# Patient Record
Sex: FEMALE | Race: WHITE | Employment: UNEMPLOYED | URBAN - METROPOLITAN AREA
[De-identification: names, ages, dates, MRNs, and addresses within clinical notes are randomized per-mention and may not be internally consistent; named-entity substitution may affect disease eponyms.]

---

## 2017-01-23 ENCOUNTER — OFFICE VISIT (OUTPATIENT)
Dept: INTERNAL MEDICINE CLINIC | Age: 37
End: 2017-01-23

## 2017-01-23 VITALS
TEMPERATURE: 98.5 F | DIASTOLIC BLOOD PRESSURE: 77 MMHG | WEIGHT: 130.8 LBS | HEIGHT: 69 IN | BODY MASS INDEX: 19.37 KG/M2 | SYSTOLIC BLOOD PRESSURE: 113 MMHG | HEART RATE: 77 BPM | RESPIRATION RATE: 12 BRPM

## 2017-01-23 DIAGNOSIS — M25.552 PAIN OF LEFT HIP JOINT: Primary | ICD-10-CM

## 2017-01-23 NOTE — PROGRESS NOTES
Chief Complaint   Patient presents with    Hip Pain     left     Left hip pain  Pt  reports she has had left hip pain for 3 months. She is very active athletically. She reports she has pain in her ant low left hip with sitting with relief with standing and exercise. She does not take anything otc as her sx will resolve with change in position.   No trauma        Past Medical History   Diagnosis Date    Abnormal Pap smear      Colposcopy in     Headache      migraines    Herpes simplex without mention of complication      Cold sores (Oct 2013)    Ill-defined condition      rosacia    Infertility      IVF with first 3 pregnancies, not this pregnancy    Unspecified breast disorder      biospy right breast,galactocele     Past Surgical History   Procedure Laterality Date    Hx leep procedure      Pr exploratory of abdomen       For infertility    Pr  delivery only       Twins    Hx other surgical       Laproscopy for Infertility    Hx other surgical       LEEP    Hx other surgical       D&C    Hx breast augmentation Bilateral 2015     BILATERAL BREAST AUGMENTATION W RIGHT BREAST PERIAREOLAR MASTOPEXY SILICONE  performed by Anat Paredes MD at [address] Hx breast reduction Right 2015     BREAST MASTOPEXY performed by Anat Paredes MD at [address] [facility] [address] Hx breast augmentation Bilateral 2015     [address] [facility] [address]    Hx  section      Hx  section  2014     Social History     Social History    Marital status:      Spouse name: N/A    Number of children: N/A    Years of education: N/A     Social History Main Topics    Smoking status: Never Smoker    Smokeless tobacco: Never Used    Alcohol use 1.2 oz/week     1 Glasses of wine, 1 Cans of beer per week      Comment: rarely    Drug use: No    Sexual activity: Yes     Partners: Male     Birth control/ protection: Surgical      Comment: vasectomy     Other Topics Concern  None     Social History Narrative    Wife of Dr. Hue Nails    At home with the kids        4 children        2, 4, 4, 6        Exercise     Gym 3 times/week     Family History   Problem Relation Age of Onset    Hypertension Father     Asthma Brother     Elevated Lipids Maternal Grandfather     Elevated Lipids Paternal Grandmother     Hypertension Paternal Grandmother      Current Outpatient Prescriptions   Medication Sig Dispense Refill    butalbital-acetaminophen-caffeine (FIORICET, ESGIC) -40 mg per tablet Take 1 Tab by mouth every six (6) hours as needed for Headache. Max Daily Amount: 4 Tabs. 30 Tab 0    rizatriptan (MAXALT) 10 mg tablet Take 1 Tab by mouth once as needed for Migraine for up to 1 dose. May repeat in 2 hours if needed 12 Tab 5    busPIRone (BUSPAR) 10 mg tablet Take 1.5 Tabs by mouth two (2) times a day. Indications: GENERALIZED ANXIETY DISORDER 270 Tab 3    valACYclovir (VALTREX) 1 gram tablet   1    FINACEA 15 % topical gel   3    venlafaxine-SR (EFFEXOR-XR) 37.5 mg capsule Take 1 Cap by mouth daily. 30 Cap 1     No Known Allergies    Review of Systems - General ROS: negative for - chills, fatigue, fever, hot flashes, malaise or night sweats  Cardiovascular ROS: no chest pain or dyspnea on exertion  Respiratory ROS: no cough, shortness of breath, or wheezing    Visit Vitals    /77 (BP 1 Location: Left arm, BP Patient Position: Sitting)    Pulse 77    Temp 98.5 °F (36.9 °C)    Resp 12    Ht 5' 9\" (1.753 m)    Wt 130 lb 12.8 oz (59.3 kg)    BMI 19.32 kg/m2     General Appearance:  Well developed, well nourished,alert and oriented x 3, and individual in no acute distress. Ears/Nose/Mouth/Throat:   Hearing grossly normal.         Neck: Supple, no lad, no bruits   Chest:   Lungs clear to auscultation bilaterally. Cardiovascular:  Regular rate and rhythm, S1, S2 normal, no murmur. Abdomen:   Soft, non-tender, bowel sounds are active.    Extremities: No edema bilaterally. Right hip FROM, left hip standing + reproducible pain with flexion and abduction with resolution on standing, Left leg from with passive movement   Skin: Warm and dry, no suspicious lesions                 Vic Crespo was seen today for hip pain. Diagnoses and all orders for this visit:    Pain of left hip joint  -     REFERRAL TO PHYSICAL THERAPY  I don't think she has hip joint involvement but may be bursa/tensor fascia robyn involvement. I will ask PT to evaluate for alignment and if leg length discrepancy at play which may be related to other muscles in the leg kinetic tree        I spent 15 min with this patient and >50% of the time was spent on counseling and management of anterior hip pain. This note will not be viewable in [address]

## 2017-09-20 ENCOUNTER — HOSPITAL ENCOUNTER (OUTPATIENT)
Dept: LAB | Age: 37
Discharge: HOME | End: 2017-09-20

## 2018-05-09 ENCOUNTER — OFFICE VISIT (OUTPATIENT)
Dept: INTERNAL MEDICINE CLINIC | Age: 38
End: 2018-05-09

## 2018-05-09 VITALS
OXYGEN SATURATION: 99 % | BODY MASS INDEX: 20.11 KG/M2 | WEIGHT: 135.8 LBS | HEART RATE: 86 BPM | SYSTOLIC BLOOD PRESSURE: 125 MMHG | DIASTOLIC BLOOD PRESSURE: 89 MMHG | HEIGHT: 69 IN | RESPIRATION RATE: 16 BRPM | TEMPERATURE: 97.5 F

## 2018-05-09 DIAGNOSIS — F41.1 GENERALIZED ANXIETY DISORDER: ICD-10-CM

## 2018-05-09 DIAGNOSIS — B00.9 HSV-1 INFECTION: ICD-10-CM

## 2018-05-09 DIAGNOSIS — G43.019 INTRACTABLE MIGRAINE WITHOUT AURA AND WITHOUT STATUS MIGRAINOSUS: ICD-10-CM

## 2018-05-09 DIAGNOSIS — G43.019 INTRACTABLE MIGRAINE WITHOUT AURA AND WITHOUT STATUS MIGRAINOSUS: Primary | ICD-10-CM

## 2018-05-09 RX ORDER — BUSPIRONE HYDROCHLORIDE 10 MG/1
TABLET ORAL
Qty: 270 TAB | Refills: 3 | Status: SHIPPED | OUTPATIENT
Start: 2018-05-09 | End: 2019-02-15 | Stop reason: SDUPTHER

## 2018-05-09 RX ORDER — VALACYCLOVIR HYDROCHLORIDE 1 G/1
TABLET, FILM COATED ORAL
Qty: 12 TAB | Refills: 1 | Status: SHIPPED | OUTPATIENT
Start: 2018-05-09 | End: 2019-07-08 | Stop reason: ALTCHOICE

## 2018-05-09 RX ORDER — RIZATRIPTAN BENZOATE 10 MG/1
10 TABLET ORAL
Qty: 12 TAB | Refills: 0 | Status: SHIPPED | OUTPATIENT
Start: 2018-05-09 | End: 2019-07-08 | Stop reason: ALTCHOICE

## 2018-05-09 RX ORDER — BUTALBITAL, ACETAMINOPHEN AND CAFFEINE 50; 325; 40 MG/1; MG/1; MG/1
1 TABLET ORAL
Qty: 30 TAB | Refills: 0 | Status: SHIPPED | OUTPATIENT
Start: 2018-05-09 | End: 2019-07-08 | Stop reason: ALTCHOICE

## 2018-05-09 NOTE — PROGRESS NOTES
Migue Henderson is a 44 y.o. female and presents with Medication Refill  . Subjective:  Pt presents for follow up. She is wife to Dr. Jelly Mccurdy and is in overall good health. 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 DAVID  She reports overall good mental health but does have periods of worry about things that does not stop in her head. She feels she dwells on them   Not having panic attacks. She does get occasional worry about her kids but better controlled than in the past  No SI /no HI  Exercising about 3 times per week  No se on buspar currently  MH 8/10  Sleeping 8 hours, energy level 7/10  Feels tired sometimes but 4 young children  Rare cafferine  Green tea powder smoothie  She does not use ambien regularly but better for her to have near her although does not take. Migraines  She is using maxalt 2/x month and fiorcet 2 times a month as well. She was on migraine prophylaxis meds prior to pregnancy and contemplated on going back. She reports getting good sleep. She is exercising regularly. HSV 1  She reports she gets flares but not as recently as in the past.       Review of Systems  Constitutional: negative for fevers, chills, anorexia and weight loss  Eyes:   negative for visual disturbance and irritation  ENT:   negative for tinnitus,sore throat,nasal congestion,ear pains. hoarseness  Respiratory:  negative for cough, hemoptysis, dyspnea,wheezing  CV:   negative for chest pain, palpitations, lower extremity edema  GI:   negative for nausea, vomiting, diarrhea, abdominal pain,melena  Endo:               negative for polyuria,polydipsia,polyphagia,heat intolerance  Genitourinary: negative for frequency, dysuria and hematuria  Integument:  negative for rash and pruritus  Hematologic:  negative for easy bruising and gum/nose bleeding  Musculoskel: negative for myalgias, arthralgias, back pain, muscle weakness, joint pain  Neurological: negative for headaches, dizziness, vertigo, memory problems and gait   Behavl/Psych: negative for feelings of anxiety, depression, mood changes    Past Medical History:   Diagnosis Date    Abnormal Pap smear     Colposcopy in     Headache     migraines    Herpes simplex without mention of complication     Cold sores (Oct 2013)    Ill-defined condition     rosacia    Infertility     IVF with first 3 pregnancies, not this pregnancy    Unspecified breast disorder     biospy right breast,galactocele     Past Surgical History:   Procedure Laterality Date     DELIVERY ONLY  2012    Twins    EXPLORATORY OF ABDOMEN      For infertility    HX BREAST AUGMENTATION Bilateral 2015    BILATERAL BREAST AUGMENTATION W RIGHT BREAST PERIAREOLAR MASTOPEXY SILICONE  performed by Pily Carrasquillo MD at [address] HX BREAST AUGMENTATION Bilateral 2015    [address], [place]    HX BREAST REDUCTION Right 2015    BREAST MASTOPEXY performed by Pily Carrasquillo MD at [address]      HX  SECTION      HX LEEP PROCEDURE      HX OTHER SURGICAL      Laproscopy for Infertility    HX OTHER SURGICAL      LEEP    HX OTHER SURGICAL      D&C     Social History     Social History    Marital status:      Spouse name: N/A    Number of children: N/A    Years of education: N/A     Social History Main Topics    Smoking status: Never Smoker    Smokeless tobacco: Never Used    Alcohol use 1.2 oz/week     1 Glasses of wine, 1 Cans of beer per week      Comment: rarely    Drug use: No    Sexual activity: Yes     Partners: Male     Birth control/ protection: Surgical      Comment: vasectomy     Other Topics Concern    None     Social History Narrative    Wife of Dr. Wilson Laws    At home with the kids        4 children        2, 4, 4, 6        Exercise     Gym 3 times/week     Family History   Problem Relation Age of Onset    Hypertension Father    [place] Asthma Brother     Elevated Lipids Maternal Grandfather     Elevated Lipids Paternal Grandmother     Hypertension Paternal Grandmother      Current Outpatient Prescriptions   Medication Sig Dispense Refill    norethindrone-e.estradiol-iron (LO LOESTRIN FE) 1 mg-10 mcg (24)/10 mcg (2) tab Take  by mouth.  busPIRone (BUSPAR) 10 mg tablet TAKE 1.5 TABS BY MOUTH TWO (2) TIMES A DAY. 270 Tab 0    zolpidem (AMBIEN) 5 mg tablet Take 1 Tab by mouth nightly as needed for Sleep. Max Daily Amount: 5 mg. Do not take with fiorocet 15 Tab 0    butalbital-acetaminophen-caffeine (FIORICET, ESGIC) -40 mg per tablet Take 1 Tab by mouth every six (6) hours as needed for Headache. Max Daily Amount: 4 Tabs. 30 Tab 0    rizatriptan (MAXALT) 10 mg tablet Take 1 Tab by mouth once as needed for Migraine for up to 1 dose. May repeat in 2 hours if needed 12 Tab 5    valACYclovir (VALTREX) 1 gram tablet as needed.   1    FINACEA 15 % topical gel   3     No Known Allergies    Objective:  Visit Vitals    /89 (BP 1 Location: Left arm, BP Patient Position: Sitting)    Pulse 86    Temp 97.5 °F (36.4 °C) (Oral)    Resp 16    Ht 5' 9\" (1.753 m)    Wt 135 lb 12.8 oz (61.6 kg)    LMP 05/06/2018    SpO2 99%    BMI 20.05 kg/m2     Physical Exam:   General appearance - alert, well appearing, and in no distress  Mental status - alert, oriented to person, place, and time  EYE-CHAIM, EOMI, corneas normal, no foreign bodies, visual acuity normal both eyes, no periorbital cellulitis  ENT-ENT exam normal, no neck nodes or sinus tenderness  Nose - normal and patent, no erythema, discharge or polyps  Mouth - mucous membranes moist, pharynx normal without lesions  Neck - supple, no significant adenopathy   Chest - clear to auscultation, no wheezes, rales or rhonchi, symmetric air entry   Heart - normal rate, regular rhythm, normal S1, S2, no murmurs, rubs, clicks or gallops   Abdomen - soft, nontender, nondistended, no masses or organomegaly  Lymph- no adenopathy palpable  Ext-peripheral pulses normal, no pedal edema, no clubbing or cyanosis  Skin-Warm and dry. no hyperpigmentation, vitiligo, or suspicious lesions  Neuro -alert, oriented, normal speech, no focal findings or movement disorder noted  Neck-normal C-spine, no tenderness, full ROM without pain      Results for orders placed or performed in visit on 25/16/79   METABOLIC PANEL, COMPREHENSIVE   Result Value Ref Range    Glucose 91 65 - 99 mg/dL    BUN 13 6 - 20 mg/dL    Creatinine 0.61 0.57 - 1.00 mg/dL    GFR est non- >59 mL/min/1.73    GFR est  >59 mL/min/1.73    BUN/Creatinine ratio 21 (H) 8 - 20    Sodium 141 134 - 144 mmol/L    Potassium 4.0 3.5 - 5.2 mmol/L    Chloride 99 97 - 108 mmol/L    CO2 24 18 - 29 mmol/L    Calcium 9.3 8.7 - 10.2 mg/dL    Protein, total 6.7 6.0 - 8.5 g/dL    Albumin 4.8 3.5 - 5.5 g/dL    GLOBULIN, TOTAL 1.9 1.5 - 4.5 g/dL    A-G Ratio 2.5 1.1 - 2.5    Bilirubin, total 0.2 0.0 - 1.2 mg/dL    Alk.  phosphatase 52 39 - 117 IU/L    AST (SGOT) 16 0 - 40 IU/L    ALT (SGPT) 13 0 - 32 IU/L   CBC W/O DIFF   Result Value Ref Range    WBC 6.5 3.4 - 10.8 x10E3/uL    RBC 4.92 3.77 - 5.28 x10E6/uL    HGB 14.1 11.1 - 15.9 g/dL    HCT 42.9 34.0 - 46.6 %    MCV 87 79 - 97 fL    MCH 28.7 26.6 - 33.0 pg    MCHC 32.9 31.5 - 35.7 g/dL    RDW 14.4 12.3 - 15.4 %    PLATELET 610 822 - 017 x10E3/uL   LIPID PANEL   Result Value Ref Range    Cholesterol, total 177 100 - 199 mg/dL    Triglyceride 125 0 - 149 mg/dL    HDL Cholesterol 75 >39 mg/dL    VLDL, calculated 25 5 - 40 mg/dL    LDL, calculated 77 0 - 99 mg/dL   TSH, 3RD GENERATION   Result Value Ref Range    TSH 1.810 0.450 - 4.500 uIU/mL   VITAMIN D, 25 HYDROXY   Result Value Ref Range    VITAMIN D, 25-HYDROXY 21.1 (L) 30.0 - 100.0 ng/mL   FERRITIN   Result Value Ref Range    Ferritin 15 15 - 150 ng/mL   CVD REPORT   Result Value Ref Range    INTERPRETATION Note             Diagnoses and all orders for this visit: 1. Intractable migraine without aura and without status migrainosus  Pt presents for follow up   Currently stable  I think she may benefit from prophylaxis  -     REFERRAL TO NEUROLOGY  -     butalbital-acetaminophen-caffeine (FIORICET, ESGIC) -40 mg per tablet; Take 1 Tab by mouth every six (6) hours as needed for Headache.  -     rizatriptan (MAXALT) 10 mg tablet; Take 1 Tab by mouth once as needed for Migraine for up to 1 dose. May repeat in 2 hours if needed  -     METABOLIC PANEL, COMPREHENSIVE; Future  -     LIPID PANEL; Future  -     TSH 3RD GENERATION; Future  -     VITAMIN D, 25 HYDROXY; Future  -     CBC W/O DIFF; Future  -     VITAMIN B12; Future    2. HSV-1 infection  -     valACYclovir (VALTREX) 1 gram tablet; Take 2 tabs repeat in 12 hours then done per outbreak 4 tabs /each outbreak      4. Generalized anxiety disorder  Cont current regimen  Discussed CBT which may help as well, Feeling Good book by Dr. Tam Trevino discussed  -     busPIRone (BUSPAR) 10 mg tablet; TAKE 1.5 TABS BY MOUTH TWO (2) TIMES A DAY. Other orders  I did not see her PAP in system. She sees Dr. Mahendra Crowe. She will get further refills from him  -     norethindrone-e.estradiol-iron (LO LOESTRIN FE) 1 mg-10 mcg (24)/10 mcg (2) tab; Take 1 Tab by mouth daily. This note will not be viewable in [address]

## 2018-05-09 NOTE — PROGRESS NOTES
Palmira Whyte is a 44 y.o. female    Chief Complaint   Patient presents with    Medication Refill       1. Have you been to the ER, urgent care clinic since your last visit? Hospitalized since your last visit? No    2. Have you seen or consulted any other health care providers outside of the [address] [place] [address] since your last visit? Include any pap smears or colon screening.   no    Visit Vitals    /89 (BP 1 Location: Left arm, BP Patient Position: Sitting)    Pulse 86    Temp 97.5 °F (36.4 °C) (Oral)    Resp 16    Ht 5' 9\" (1.753 m)    Wt 135 lb 12.8 oz (61.6 kg)    LMP 05/06/2018    SpO2 99%    BMI 20.05 kg/m2

## 2018-05-22 ENCOUNTER — OFFICE VISIT (OUTPATIENT)
Dept: NEUROLOGY | Age: 38
End: 2018-05-22

## 2018-05-22 VITALS
HEIGHT: 69 IN | OXYGEN SATURATION: 97 % | SYSTOLIC BLOOD PRESSURE: 130 MMHG | WEIGHT: 133 LBS | BODY MASS INDEX: 19.7 KG/M2 | HEART RATE: 82 BPM | DIASTOLIC BLOOD PRESSURE: 80 MMHG

## 2018-05-22 DIAGNOSIS — G43.109 MIGRAINE WITH AURA AND WITHOUT STATUS MIGRAINOSUS, NOT INTRACTABLE: Primary | ICD-10-CM

## 2018-05-22 RX ORDER — FLUTICASONE PROPIONATE 50 MCG
2 SPRAY, SUSPENSION (ML) NASAL AS NEEDED
COMMUNITY

## 2018-05-22 RX ORDER — MELATONIN 5 MG
5 CAPSULE ORAL
COMMUNITY
End: 2019-07-08 | Stop reason: ALTCHOICE

## 2018-05-22 RX ORDER — TOPIRAMATE 50 MG/1
100 TABLET, FILM COATED ORAL
Qty: 60 TAB | Refills: 5 | Status: SHIPPED | OUTPATIENT
Start: 2018-05-22 | End: 2019-07-08 | Stop reason: ALTCHOICE

## 2018-05-22 RX ORDER — BISMUTH SUBSALICYLATE 262 MG
1 TABLET,CHEWABLE ORAL DAILY
COMMUNITY
End: 2020-05-12

## 2018-05-22 RX ORDER — IBUPROFEN 800 MG/1
800 TABLET ORAL
COMMUNITY

## 2018-05-22 NOTE — MR AVS SNAPSHOT
[address] [place] [address] [address] [facility] [address] [facility] [id number] [phone] Patient: Radha Bae MRN: [id number] JMM:[id number] Visit Information Date & Time Provider Department Dept. Phone Encounter #  
 5/22/2018  1:00 PM Willie Bean MD [facility] [phone] [id number] Follow-up Instructions Return in about 3 months (around 8/22/2018). Upcoming Health Maintenance Date Due DTaP/Tdap/Td series (1 - Tdap) 6/7/2000 PAP AKA CERVICAL CYTOLOGY 4/15/2018 Influenza Age 5 to Adult 8/1/2018 Allergies as of 5/22/2018  Review Complete On: 5/9/2018 By: Mari Nichole MD  
 No Known Allergies Current Immunizations  Never Reviewed Name Date Influenza Vaccine 11/24/2014 Influenza Vaccine (Quad) PF 10/20/2015 Not reviewed this visit You Were Diagnosed With   
  
 Codes Comments Migraine with aura and without status migrainosus, not intractable    -  Primary ICD-10-CM: G43.109 ICD-9-CM: 346.00 Vitals BP Pulse Height(growth percentile) Weight(growth percentile) LMP SpO2  
 130/80 82 5' 9\" (1.753 m) 133 lb (60.3 kg) 05/06/2018 97% BMI OB Status Smoking Status 19.64 kg/m2 Having regular periods Never Smoker BMI and BSA Data Body Mass Index Body Surface Area 19.64 kg/m 2 1.71 m 2 Preferred Pharmacy Pharmacy Name Phone [id number] [address] [phone] Your Updated Medication List  
  
   
This list is accurate as of 5/22/18  1:46 PM.  Always use your most recent med list.  
  
  
  
  
 busPIRone 10 mg tablet Commonly known as:  BUSPAR  
TAKE 1.5 TABS BY MOUTH TWO (2) TIMES A DAY. butalbital-acetaminophen-caffeine -40 mg per tablet Commonly known as:  Eileenzabeverly Ferrari  
 Take 1 Tab by mouth every six (6) hours as needed for Headache. FINACEA 15 % topical gel Generic drug:  azelaic acid FLONASE ALLERGY RELIEF 50 mcg/actuation nasal spray Generic drug:  fluticasone 2 Sprays by Both Nostrils route daily. ibuprofen 800 mg tablet Commonly known as:  MOTRIN Take 800 mg by mouth every eight (8) hours as needed for Pain.  
  
 melatonin 5 mg Cap capsule Take 5 mg by mouth nightly. multivitamin tablet Commonly known as:  ONE A DAY Take 1 Tab by mouth daily. norethindrone-e.estradiol-iron 1 mg-10 mcg (24)/10 mcg (2) Tab Commonly known as:  LO LOESTRIN FE Take 1 Tab by mouth daily. rizatriptan 10 mg tablet Commonly known as:  Alonso Lonny Take 1 Tab by mouth once as needed for Migraine for up to 1 dose. May repeat in 2 hours if needed  
  
 topiramate 50 mg tablet Commonly known as:  TOPAMAX Take 2 Tabs by mouth nightly. valACYclovir 1 gram tablet Commonly known as:  VALTREX Take 2 tabs repeat in 12 hours then done per outbreak 4 tabs /each outbreak  
  
 zolpidem 5 mg tablet Commonly known as:  AMBIEN Take 1 Tab by mouth nightly as needed for Sleep. Max Daily Amount: 5 mg. Do not take with fiorocet Prescriptions Sent to Pharmacy Refills  
 topiramate (TOPAMAX) 50 mg tablet 5 Sig: Take 2 Tabs by mouth nightly. Class: Normal  
 Pharmacy: [facility] [address] [place] [address], [address] [place] [address] #: [phone] Route: Oral  
  
Follow-up Instructions Return in about 3 months (around 8/22/2018). Patient Instructions Aravind Archer 1721 What is a living will? A living will is a legal form you use to write down the kind of care you want at the end of your life. It is used by the health professionals who will treat you if you aren't able to decide for yourself.  
If you put your wishes in writing, your loved ones and others will know what kind of care you want. They won't need to guess. This can ease your mind and be helpful to others. A living will is not the same as an estate or property will. An estate will explains what you want to happen with your money and property after you die. Is a living will a legal document? A living will is a legal document. Each state has its own laws about living love. If you move to another state, make sure that your living will is legal in the state where you now live. Or you might use a universal form that has been approved by many states. This kind of form can sometimes be completed and stored online. Your electronic copy will then be available wherever you have a connection to the Internet. In most cases, doctors will respect your wishes even if you have a form from a different state. · You don't need an  to complete a living will. But legal advice can be helpful if your state's laws are unclear, your health history is complicated, or your family can't agree on what should be in your living will. · You can change your living will at any time. Some people find that their wishes about end-of-life care change as their health changes. · In addition to making a living will, think about completing a medical power of  form. This form lets you name the person you want to make end-of-life treatment decisions for you (your \"health care agent\") if you're not able to. Many hospitals and nursing homes will give you the forms you need to complete a living will and a medical power of . · Your living will is used only if you can't make or communicate decisions for yourself anymore. If you become able to make decisions again, you can accept or refuse any treatment, no matter what you wrote in your living will. · Your state may offer an online registry. This is a place where you can store your living will online so the doctors and nurses who need to treat you can find it right away. What should you think about when creating a living will? Talk about your end-of-life wishes with your family members and your doctor. Let them know what you want. That way the people making decisions for you won't be surprised by your choices. Think about these questions as you make your living will: · Do you know enough about life support methods that might be used? If not, talk to your doctor so you know what might be done if you can't breathe on your own, your heart stops, or you're unable to swallow. · What things would you still want to be able to do after you receive life-support methods? Would you want to be able to walk? To speak? To eat on your own? To live without the help of machines? · If you have a choice, where do you want to be cared for? In your home? At a hospital or nursing home? · Do you want certain Zoroastrianism practices performed if you become very ill? · If you have a choice at the end of your life, where would you prefer to die? At home? In a hospital or nursing home? Somewhere else? · Would you prefer to be buried or cremated? · Do you want your organs to be donated after you die? What should you do with your living will? · Make sure that your family members and your health care agent have copies of your living will. · Give your doctor a copy of your living will to keep in your medical record. If you have more than one doctor, make sure that each one has a copy. · You may want to put a copy of your living will where it can be easily found. Where can you learn more? Go to http://hari-debbi.info/. Enter V448 in the search box to learn more about \"Learning About Living Homero Paulson. \" Current as of: September 24, 2016 Content Version: 11.4 © 8607-3609 [employer]wise, [employer]orporated. Care instructions adapted under license by [employer] (which disclaims liability or warranty for this information).  If you have questions about a medical condition or this instruction, always ask your healthcare professional. [address] any warranty or liability for your use of this information. Advance Directives: Care Instructions Your Care Instructions An advance directive is a legal way to state your wishes at the end of your life. It tells your family and your doctor what to do if you can no longer say what you want. There are two main types of advance directives. You can change them any time that your wishes change. · A living will tells your family and your doctor your wishes about life support and other treatment. · A durable power of  for health care lets you name a person to make treatment decisions for you when you can't speak for yourself. This person is called a health care agent. If you do not have an advance directive, decisions about your medical care may be made by a doctor or a  who doesn't know you. It may help to think of an advance directive as a gift to the people who care for you. If you have one, they won't have to make tough decisions by themselves. Follow-up care is a key part of your treatment and safety. Be sure to make and go to all appointments, and call your doctor if you are having problems. It's also a good idea to know your test results and keep a list of the medicines you take. How can you care for yourself at home? · Discuss your wishes with your loved ones and your doctor. This way, there are no surprises. · Many states have a unique form. Or you might use a universal form that has been approved by many states. This kind of form can sometimes be completed and stored online. Your electronic copy will then be available wherever you have a connection to the Internet. In most cases, doctors will respect your wishes even if you have a form from a different state. · You don't need a  to do an advance directive. But you may want to get legal advice. · Think about these questions when you prepare an advance directive: ¨ Who do you want to make decisions about your medical care if you are not able to? Many people choose a family member or close friend. ¨ Do you know enough about life support methods that might be used? If not, talk to your doctor so you understand. ¨ What are you most afraid of that might happen? You might be afraid of having pain, losing your independence, or being kept alive by machines. ¨ Where would you prefer to die? Choices include your home, a hospital, or a nursing home. ¨ Would you like to have information about hospice care to support you and your family? ¨ Do you want to donate organs when you die? ¨ Do you want certain Advent practices performed before you die? If so, put your wishes in the advance directive. · Read your advance directive every year, and make changes as needed. When should you call for help? Be sure to contact your doctor if you have any questions. Where can you learn more? Go to http://hari-debbi.info/. Enter R264 in the search box to learn more about \"Advance Directives: Care Instructions. \" Current as of: September 24, 2016 Content Version: 11.4 © 2134-6901 [employer]. Care instructions adapted under license by [employer] (which disclaims liability or warranty for this information). If you have questions about a medical condition or this instruction, always ask your healthcare professional. [place] [address] any warranty or liability for your use of this information. PRESCRIPTION REFILL POLICY [facility] Statement to Patients April 1, 2014 In an effort to ensure the large volume of patient prescription refills is processed in the most efficient and expeditious manner, we are asking our patients to assist us by calling your Pharmacy for all prescription refills, this will include also your  Mail Order Pharmacy. The pharmacy will contact our office electronically to continue the refill process. Please do not wait until the last minute to call your pharmacy. We need at least 48 hours (2days) to fill prescriptions. We also encourage you to call your pharmacy before going to  your prescription to make sure it is ready. With regard to controlled substance prescription refill requests (narcotic refills) that need to be picked up at our office, we ask your cooperation by providing us with at least 72 hours (3days) notice that you will need a refill. We will not refill narcotic prescription refill requests after 4:00pm on any weekday, Monday through Thursday, or after 2:00pm on Fridays, or on the weekends. We encourage everyone to explore another way of getting your prescription refill request processed using [employer], our patient web portal through our electronic medical record system. [employer] is an efficient and effective way to communicate your medication request directly to the office and  downloadable as an gemma on your smart phone . [employer] also features a review functionality that allows you to view your medication list as well as leave messages for your physician. Are you ready to get connected? If so please review the attatched instructions or speak to any of our staff to get you set up right away! Thank you so much for your cooperation. Should you have any questions please contact our Practice Administrator. The Physicians and Staff,  [facility] Patient Instruction Plan/ Result Policy If we have ordered testing for you, know that; \"NO NEWS IS GOOD NEWS! \" It is our policy that we know longer call patients with results, nor do we  give test results over the phone. We schedule follow up appointments so that your results can be discussed in person.  This allows you to address any questions you have regarding the results. If something of concern is revealed on your test, we will contact you to discuss the matter and if needed schedule a sooner follow up appointment. Additionally, results may be found by using the My Chart feature and one of our patient service representatives at the  can give you instructions on how to access this feature to utilize our electronic medical record system. Thank you for your understanding. Topamax 50mg tabs Take 1/2 tab at night for one week then Take one tab at night for one week then Take one and 1/2  tabs at night for one week then Take two tabs at night If you have issues with this medication or need an increase in the dosage please call the office for further instructions Topiramate (By mouth) Topiramate (toe-PIR-a-mate) Treats and prevents seizures, and helps prevent migraine headaches. Brand Name(s): Qudexy XR, Topamax, Trokendi XR There may be other brand names for this medicine. When This Medicine Should Not Be Used: This medicine is not right for everyone. Do not use it if you had an allergic reaction to topiramate, or if you are pregnant. How to Use This Medicine:  
Capsule, Long Acting Capsule, Tablet · Take your medicine as directed. Your dose may need to be changed several times to find what works best for you. · Tablet: Swallow whole. Do not break, crush, or chew the tablet. It has a very bitter taste. · Capsule or extended-release capsule: Do not crush or chew the capsule. Swallow whole or open the capsule and pour the medicine into a small amount (1 teaspoon) of soft food, such as applesauce. Swallow the mixture right away without chewing. Do not store the mixture for use at a later time. · Drink extra fluids so you will urinate more often and help prevent kidney problems. · This medicine should come with a Medication Guide. Ask your pharmacist for a copy if you do not have one. · Missed dose: Take a dose as soon as you remember. If it is almost time for your next dose, wait until then and take a regular dose. Do not take extra medicine to make up for a missed dose. If you miss a dose or forget to use your medicine, use it as soon as you can. If your next regular dose of Topamax® is less than 6 hours away, wait until then to use the medicine and skip the missed dose. If you miss more than 1 dose of Topamax®, call your doctor for instructions. · Store the medicine in a closed container at room temperature, away from heat, moisture, and direct light. Drugs and Foods to Avoid: Ask your doctor or pharmacist before using any other medicine, including over-the-counter medicines, vitamins, and herbal products. · Do not drink alcohol with Qudexy XR or Topamax®. Do not drink alcohol for 6 hours before and 6 hours after you take the Trokendi XR capsule. · Some medicines can affect how topiramate works. Tell your doctor if you are using acetazolamide, dichlorphenamide, dichloralphenazone, digoxin, lithium, metformin, zonisamide, other medicine for seizures (such as carbamazepine, phenytoin, valproic acid), or birth control pills. · Tell your doctor if you are using any medicine that makes you sleepy, such as allergy medicine or narcotic pain medicine. Warnings While Using This Medicine: · It is not safe to take this medicine during pregnancy. It could harm an unborn baby. Tell your doctor right away if you become pregnant. · Tell your doctor if you are breastfeeding, or if you have kidney disease, liver disease, glaucoma, lung or breathing problems, osteoporosis, or a history of depression or mood disorders. Tell your doctor if you are on a ketogenic diet (high in fat and low in carbohydrates). · This medicine may cause the following problems: ¨ Eye pain or vision changes, including glaucoma ¨ Changes in body temperature ¨ Metabolic acidosis (too much acid in the blood) ¨ Kidney stones · This medicine may increase depression or thoughts of suicide. Tell your doctor right away if you start to feel more depressed or think about hurting yourself. · This medicine may make you dizzy, drowsy, or tired. Do not drive or do anything else that could be dangerous until you know how this medicine affects you. · Do not stop using this medicine suddenly. Your doctor will need to slowly decrease your dose before you stop it completely. · Your doctor will do lab tests at regular visits to check on the effects of this medicine. Keep all appointments. · Keep all medicine out of the reach of children. Never share your medicine with anyone. Possible Side Effects While Using This Medicine:  
Call your doctor right away if you notice any of these side effects: · Allergic reaction: Itching or hives, swelling in your face or hands, swelling or tingling in your mouth or throat, chest tightness, trouble breathing · Bloody or cloudy urine, painful urination, sudden lower back or stomach pain · Changes in vision, eye pain · Confusion, problems with walking, clumsiness, dizziness, or trouble talking, concentrating, or remembering · Feeling agitated, depressed, nervous, or irritable, thoughts of hurting yourself or others, unusual mood or behavior · Fever, decreased sweating · Numbness, tingling, or burning pain in your hands, arms, legs, or feet · Rapid, deep breathing, loss of appetite, fast or uneven heartbeat · Vomiting, unusual drowsiness, tiredness, or weakness If you notice these less serious side effects, talk with your doctor: · Change in taste · Nausea, diarrhea · Stuffy or runny nose · Weight loss If you notice other side effects that you think are caused by this medicine, tell your doctor. Call your doctor for medical advice about side effects. You may report side effects to FDA at 4-773-FCO-2996 © 2017 [facility] Information is for End User's use only and may not be sold, redistributed or otherwise used for commercial purposes. The above information is an  only. It is not intended as medical advice for individual conditions or treatments. Talk to your doctor, nurse or pharmacist before following any medical regimen to see if it is safe and effective for you. Introducing [facility]! Dear Justyn Abdi: Thank you for requesting a [employer] account. Our records indicate that you already have an active Ecrebo account. You can access your account anytime at [email or web address] Did you know that you can access your hospital and ER discharge instructions at any time in Ecrebo? You can also review all of your test results from your hospital stay or ER visit. Additional Information If you have questions, please visit the Frequently Asked Questions section of the [employer] website at [email or web address] Remember, [employer]bo is NOT to be used for urgent needs. For medical emergencies, dial 911. Now available from your iPhone and Android! Please provide this summary of care documentation to your next provider. Your primary care clinician is listed as Abhilash Martinez. If you have any questions after today's visit, please call [phone].

## 2018-05-22 NOTE — PROGRESS NOTES
Reviewed record in preparation for visit and have necessary documentation  Pt did not bring medication to office visit for review  Medication list reviewed and reconciled with patient  Information was given to pt on Advanced Directives, Living Will  opportunity was given for questions    Chief Complaint   Patient presents with    Headache     1. Have you been to the ER, urgent care clinic since your last visit? Hospitalized since your last visit? No    2. Have you seen or consulted any other health care providers outside of the [address] [place] [address] since your last visit? Include any pap smears or colon screening.  No

## 2018-05-22 NOTE — LETTER
Dear Yajaira Hernandez MD, Thank you for allowing me to see your patient, Lonnie Betancourt for a neurological consultation. Please see my impression and recommendations as outlined in my note. Sincerely, Edwardo Johnson MD 
[facility] at [address] record in preparation for visit and have necessary documentation Pt did not bring medication to office visit for review Medication list reviewed and reconciled with patient Information was given to pt on Advanced Directives, Living Will 
opportunity was given for questions Chief Complaint Patient presents with  
 Headache 1. Have you been to the ER, urgent care clinic since your last visit? Hospitalized since your last visit? No 
 
2. Have you seen or consulted any other health care providers outside of the [address] [place] [address] since your last visit? Include any pap smears or colon screening. No  
 
 
 
NEUROLOGY NEW PATIENT CONSULTATION 
 
REFERRED BY: 
Yajaira Hernandez MD 
 
CHIEF COMPLAINT: 
Migraines HISTORY OF PRESENT ILLNESS HISTORY PROVIDED BY: 
Patient Lonnie Betancourt is a 44 y.o. female who I am asked to see in consultation for migraines. Patient has had migraines since age 12. Over the years they have changed somewhat in quality. She used to experience complicated migraine where she would have difficulty with speech, facial recognition, and numbness. Over time, however, she now mostly experiences more typical migraine with aura and pain. She has never had any neuroimaging. She is always been told she has classic migraine. Headache Characterization: throbbing and stabbing Pain Level: 10/10 Aura: Yes, loss of peripheral vision Frequency/Length: Can last up to 3 days and occurs about 15 days per month Location: Always unilateral 
Nausea/Vomiting: Yes Photophobia: Yes Phonophobia: Yes 
 Provoking factors: High-pitched noises, anxiety, bright lights, irregular meals Relieving factors: Ductus, quiet, rest, scalp pressure Focal neurologic symptoms with headache: No 
 
Meds: 
Prior abortive tx: Imitrex (injectable and nasal), Zomig tablets, Relpax, Maxalt, Fioricet Prior preventative tx: Topamax Current abortive tx: Maxalt and Fioricet Current preventative tx: None Family Hx of headaches/migraines: Father Social: 
Work: States at home with her 4 children (ages 9 twin 10year-olds, and 3year-old) Caffeine: No 
Tobacco use: No 
Sleep habits: Will occasionally wake but overall sleeps okay Exercise: Exercises regularly Patient did correlate her menstrual cycle with headaches. She has been tried on birth control pills for about 1 year but has not noticed any significant benefit with this. She has seen neurology in the past.  She has lived in several cities. Typically she has seen a neurologist in each place she has been. PMH Past Medical History:  
Diagnosis Date  Abnormal Pap smear Colposcopy in 2002  Headache   
 migraines  Herpes simplex without mention of complication Cold sores (Oct 2013)  Ill-defined condition   
 rosacia  Infertility IVF with first 3 pregnancies, not this pregnancy  Unspecified breast disorder   
 biospy right breast,galactocele [facility] Social History Social History  Marital status:  Spouse name: N/A  
 Number of children: N/A  
 Years of education: N/A Social History Main Topics  Smoking status: Never Smoker  Smokeless tobacco: Never Used  Alcohol use 1.2 oz/week 1 Glasses of wine, 1 Cans of beer per week Comment: rarely  Drug use: No  
 Sexual activity: Yes  
  Partners: Male Birth control/ protection: Surgical  
   Comment: vasectomy Other Topics Concern  Not on file Social History Narrative Wife of Dr. Estevan Wise At home with the kids 4 children 2, 4, 4, 6 Exercise Gym 3 times/week [facility] Family History Problem Relation Age of Onset  Hypertension Father  Asthma Brother  Elevated Lipids Maternal Grandfather  Elevated Lipids Paternal Grandmother  Hypertension Paternal Grandmother ALLERGIES No Known Allergies CURRENT MEDS Current Outpatient Prescriptions Medication Sig Dispense Refill  fluticasone (FLONASE ALLERGY RELIEF) 50 mcg/actuation nasal spray 2 Sprays by Both Nostrils route daily.  melatonin 5 mg cap capsule Take 5 mg by mouth nightly.  multivitamin (ONE A DAY) tablet Take 1 Tab by mouth daily.  ibuprofen (MOTRIN) 800 mg tablet Take 800 mg by mouth every eight (8) hours as needed for Pain.  topiramate (TOPAMAX) 50 mg tablet Take 2 Tabs by mouth nightly. 60 Tab 5  
 norethindrone-e.estradiol-iron (LO LOESTRIN FE) 1 mg-10 mcg (24)/10 mcg (2) tab Take 1 Tab by mouth daily. 3 Package 0  
 butalbital-acetaminophen-caffeine (FIORICET, ESGIC) -40 mg per tablet Take 1 Tab by mouth every six (6) hours as needed for Headache. 30 Tab 0  
 rizatriptan (MAXALT) 10 mg tablet Take 1 Tab by mouth once as needed for Migraine for up to 1 dose. May repeat in 2 hours if needed 12 Tab 0  
 busPIRone (BUSPAR) 10 mg tablet TAKE 1.5 TABS BY MOUTH TWO (2) TIMES A DAY. 270 Tab 3  
 valACYclovir (VALTREX) 1 gram tablet Take 2 tabs repeat in 12 hours then done per outbreak 4 tabs /each outbreak 12 Tab 1  
 zolpidem (AMBIEN) 5 mg tablet Take 1 Tab by mouth nightly as needed for Sleep. Max Daily Amount: 5 mg. Do not take with fiorocet 15 Tab 0  
 FINACEA 15 % topical gel   3 REVIEW OF SYSTEMS:  
 
Y  N       Y  N  Y  N   Y  N 
[] [x] AIDS          [] [x] Falls  [] [x] Memory Loss  [] [x]  Shortness of breath [x] [] Anxiety          [] [x] Fatigue [] [x] Muscle Pain        [] [x]  Skipped beats 
[] [x] Chest Pain   [x] [] Frequent HA [] [x] Ms Weakness     [] [x]  Snoring [] [x] Constipation [] [x]Hearing loss [] [x] Nause/Vomiting  [] [x]  Stomach Pain 
[] [x] Cough          [] [x]Hepatitis [] [x] Neuropathy         [] [x]  Swallowing difficulty 
[] [x] Depression  [] [x]Incontinence [] [x] Poor appetite      [] [x]  Vertigo 
[] [x] Diarrhea       [] [x] Joint Pain [] [x] Rash                   [] [x]  Visual disturbances 
[] [x] Fainting        [] [x] Leg Swelling [] [x] Ringing ears       [] [x]  Weight changes []Unable to obtain  ROS due to  []mental status change  []sedated   []intubated PREVIOUS WORKUP IMAGING: None LABS Results for orders placed or performed in visit on 08/25/16 METABOLIC PANEL, COMPREHENSIVE Result Value Ref Range Glucose 91 65 - 99 mg/dL BUN 13 6 - 20 mg/dL Creatinine 0.61 0.57 - 1.00 mg/dL GFR est non- >59 mL/min/1.73 GFR est  >59 mL/min/1.73  
 BUN/Creatinine ratio 21 (H) 8 - 20 Sodium 141 134 - 144 mmol/L Potassium 4.0 3.5 - 5.2 mmol/L Chloride 99 97 - 108 mmol/L  
 CO2 24 18 - 29 mmol/L Calcium 9.3 8.7 - 10.2 mg/dL Protein, total 6.7 6.0 - 8.5 g/dL Albumin 4.8 3.5 - 5.5 g/dL GLOBULIN, TOTAL 1.9 1.5 - 4.5 g/dL A-G Ratio 2.5 1.1 - 2.5 Bilirubin, total 0.2 0.0 - 1.2 mg/dL Alk. phosphatase 52 39 - 117 IU/L  
 AST (SGOT) 16 0 - 40 IU/L  
 ALT (SGPT) 13 0 - 32 IU/L  
CBC W/O DIFF Result Value Ref Range WBC 6.5 3.4 - 10.8 x10E3/uL  
 RBC 4.92 3.77 - 5.28 x10E6/uL HGB 14.1 11.1 - 15.9 g/dL HCT 42.9 34.0 - 46.6 % MCV 87 79 - 97 fL  
 MCH 28.7 26.6 - 33.0 pg  
 MCHC 32.9 31.5 - 35.7 g/dL  
 RDW 14.4 12.3 - 15.4 % PLATELET 003 115 - 212 x10E3/uL LIPID PANEL Result Value Ref Range Cholesterol, total 177 100 - 199 mg/dL Triglyceride 125 0 - 149 mg/dL HDL Cholesterol 75 >39 mg/dL VLDL, calculated 25 5 - 40 mg/dL LDL, calculated 77 0 - 99 mg/dL  
TSH, 3RD GENERATION Result Value Ref Range TSH 1.810 0.450 - 4.500 uIU/mL VITAMIN D, 25 HYDROXY Result Value Ref Range VITAMIN D, 25-HYDROXY 21.1 (L) 30.0 - 100.0 ng/mL FERRITIN Result Value Ref Range Ferritin 15 15 - 150 ng/mL CVD REPORT Result Value Ref Range INTERPRETATION Note PHYSICAL EXAM 
Visit Vitals  /80  Pulse 82  Ht 5' 9\" (1.753 m)  Wt 60.3 kg (133 lb)  LMP 05/06/2018  SpO2 97%  BMI 19.64 kg/m2 General:  Alert, cooperative, no distress. Head:  Normocephalic, without obvious abnormality, atraumatic. Eyes:  Conjunctivae/corneas clear. Pupils equal, round, reactive to light. Extraocular movements intact, VFF, NO papilledema Lungs: 
Heart:   Non labored breathing Regular rate and rhythm, no carotid bruits Abdomen:   Soft, non-distended Extremities: Extremities normal, atraumatic, no cyanosis or edema. Pulses: 2+ and symmetric all extremities. Skin: Skin color, texture, turgor normal. No rashes or lesions. Neurologic:  Gen: Attention normal 
           Language: naming, repetition, fluency normal 
           Memory: intact recent and remote memory Cranial Nerves: 
I: smell Not tested II: visual fields Full to confrontation II: pupils Equal, round, reactive to light II: optic disc No papilledema III,VII: ptosis none III,IV,VI: extraocular muscles  Full ROM V: mastication normal  
V: facial light touch sensation  normal  
VII: facial muscle function   symmetric VIII: hearing symmetric IX: soft palate elevation  normal  
XI: trapezius strength  5/5 XI: sternocleidomastoid strength 5/5 XI: neck flexion strength  5/5 XII: tongue  midline Motor: normal bulk and tone, no tremor Strength: 5/5 all four extremities Sensory: intact to LT, PP, vibration, and temperature Coordination: FTN intact, Rhomberg negative Gait: normal gait including tandem Reflexes: 2+ throughout IMPRESSION Jordan Avila is a 44 y.o. female who presents for evaluation of migraine headaches. Patient has migraine with aura. She has a history of comp get a migraine but no longer suffers from this. For abortive she uses Maxalt and Fioricet. These work well. Given that she is having so many migraine days per month, I think she would benefit from a preventative. We discussed several options for this but since patient did well with Topamax in the past will proceed with this choice. RECOMMENDATIONS Problem List Items Addressed This Visit Migraine - Primary Relevant Medications · topiramate (TOPAMAX) 50 mg tabletwe will start 25 mg and titrate up to 100. · Discussed Trokendi XR as an option if she has any side effects from the generic Topamax · Migraine book given · Encouraged migraine diary · Abortive with Maxalt and Fioricet (she is done well with all triptan's but these are her current prescriptions) · No neuroimaging needed at this time · Encouraged patient to stop her birth control pill if she is not getting benefit from a headache perspective and does not need for any other reason FU 3 months Eliud Tapia MD 
 
CC: Ankit Hernandez MD 
Fax: [phone] Medications and side effects discussed with patient in detail. With any new medications prescribed, patient was given instructions on administration and side effects. Written medication information was provided to the patient as well. This note was created using voice recognition software. Despite editing, there may be syntax errors. This note will not be viewable in [address]

## 2018-05-22 NOTE — PATIENT INSTRUCTIONS
Learning About Living Tip  What is a living will? A living will is a legal form you use to write down the kind of care you want at the end of your life. It is used by the health professionals who will treat you if you aren't able to decide for yourself. If you put your wishes in writing, your loved ones and others will know what kind of care you want. They won't need to guess. This can ease your mind and be helpful to others. A living will is not the same as an estate or property will. An estate will explains what you want to happen with your money and property after you die. Is a living will a legal document? A living will is a legal document. Each state has its own laws about living love. If you move to another state, make sure that your living will is legal in the state where you now live. Or you might use a universal form that has been approved by many states. This kind of form can sometimes be completed and stored online. Your electronic copy will then be available wherever you have a connection to the Internet. In most cases, doctors will respect your wishes even if you have a form from a different state. · You don't need an  to complete a living will. But legal advice can be helpful if your state's laws are unclear, your health history is complicated, or your family can't agree on what should be in your living will. · You can change your living will at any time. Some people find that their wishes about end-of-life care change as their health changes. · In addition to making a living will, think about completing a medical power of  form. This form lets you name the person you want to make end-of-life treatment decisions for you (your \"health care agent\") if you're not able to. Many hospitals and nursing homes will give you the forms you need to complete a living will and a medical power of .   · Your living will is used only if you can't make or communicate decisions for yourself anymore. If you become able to make decisions again, you can accept or refuse any treatment, no matter what you wrote in your living will. · Your state may offer an online registry. This is a place where you can store your living will online so the doctors and nurses who need to treat you can find it right away. What should you think about when creating a living will? Talk about your end-of-life wishes with your family members and your doctor. Let them know what you want. That way the people making decisions for you won't be surprised by your choices. Think about these questions as you make your living will:  · Do you know enough about life support methods that might be used? If not, talk to your doctor so you know what might be done if you can't breathe on your own, your heart stops, or you're unable to swallow. · What things would you still want to be able to do after you receive life-support methods? Would you want to be able to walk? To speak? To eat on your own? To live without the help of machines? · If you have a choice, where do you want to be cared for? In your home? At a hospital or nursing home? · Do you want certain Sikh practices performed if you become very ill? · If you have a choice at the end of your life, where would you prefer to die? At home? In a hospital or nursing home? Somewhere else? · Would you prefer to be buried or cremated? · Do you want your organs to be donated after you die? What should you do with your living will? · Make sure that your family members and your health care agent have copies of your living will. · Give your doctor a copy of your living will to keep in your medical record. If you have more than one doctor, make sure that each one has a copy. · You may want to put a copy of your living will where it can be easily found. Where can you learn more? Go to http://hari-debbi.info/.   Enter T354 in the search box to learn more about \"Learning About Living Labkenia Cancer. \"  Current as of: September 24, 2016  Content Version: 11.4  © 7945-4271 [employer]. Care instructions adapted under license by [employer] (which disclaims liability or warranty for this information). If you have questions about a medical condition or this instruction, always ask your healthcare professional. [address] any warranty or liability for your use of this information. Advance Directives: Care Instructions  Your Care Instructions  An advance directive is a legal way to state your wishes at the end of your life. It tells your family and your doctor what to do if you can no longer say what you want. There are two main types of advance directives. You can change them any time that your wishes change. · A living will tells your family and your doctor your wishes about life support and other treatment. · A durable power of  for health care lets you name a person to make treatment decisions for you when you can't speak for yourself. This person is called a health care agent. If you do not have an advance directive, decisions about your medical care may be made by a doctor or a  who doesn't know you. It may help to think of an advance directive as a gift to the people who care for you. If you have one, they won't have to make tough decisions by themselves. Follow-up care is a key part of your treatment and safety. Be sure to make and go to all appointments, and call your doctor if you are having problems. It's also a good idea to know your test results and keep a list of the medicines you take. How can you care for yourself at home? · Discuss your wishes with your loved ones and your doctor. This way, there are no surprises. · Many states have a unique form. Or you might use a universal form that has been approved by many states. This kind of form can sometimes be completed and stored online.  Your electronic copy will then be available wherever you have a connection to the Internet. In most cases, doctors will respect your wishes even if you have a form from a different state. · You don't need a  to do an advance directive. But you may want to get legal advice. · Think about these questions when you prepare an advance directive:  ¨ Who do you want to make decisions about your medical care if you are not able to? Many people choose a family member or close friend. ¨ Do you know enough about life support methods that might be used? If not, talk to your doctor so you understand. ¨ What are you most afraid of that might happen? You might be afraid of having pain, losing your independence, or being kept alive by machines. ¨ Where would you prefer to die? Choices include your home, a hospital, or a nursing home. ¨ Would you like to have information about hospice care to support you and your family? ¨ Do you want to donate organs when you die? ¨ Do you want certain Mormonism practices performed before you die? If so, put your wishes in the advance directive. · Read your advance directive every year, and make changes as needed. When should you call for help? Be sure to contact your doctor if you have any questions. Where can you learn more? Go to http://hari-debbi.info/. Enter R264 in the search box to learn more about \"Advance Directives: Care Instructions. \"  Current as of: September 24, 2016  Content Version: 11.4  © 5191-9985 [employer]. Care instructions adapted under license by [employer] (which disclaims liability or warranty for this information). If you have questions about a medical condition or this instruction, always ask your healthcare professional. [place] [address] any warranty or liability for your use of this information.   [facility]   Statement to Patients  April 1, 2014      In an effort to ensure the large volume of patient prescription refills is processed in the most efficient and expeditious manner, we are asking our patients to assist us by calling your Pharmacy for all prescription refills, this will include also your  Mail Order Pharmacy. The pharmacy will contact our office electronically to continue the refill process. Please do not wait until the last minute to call your pharmacy. We need at least 48 hours (2days) to fill prescriptions. We also encourage you to call your pharmacy before going to  your prescription to make sure it is ready. With regard to controlled substance prescription refill requests (narcotic refills) that need to be picked up at our office, we ask your cooperation by providing us with at least 72 hours (3days) notice that you will need a refill. We will not refill narcotic prescription refill requests after 4:00pm on any weekday, Monday through Thursday, or after 2:00pm on Fridays, or on the weekends. We encourage everyone to explore another way of getting your prescription refill request processed using [employer], our patient web portal through our electronic medical record system. [employer] is an efficient and effective way to communicate your medication request directly to the office and  downloadable as an gemma on your smart phone . [employer] also features a review functionality that allows you to view your medication list as well as leave messages for your physician. Are you ready to get connected? If so please review the attatched instructions or speak to any of our staff to get you set up right away! Thank you so much for your cooperation. Should you have any questions please contact our Practice Administrator. The Physicians and Staff,  [facility]   Patient Instruction Plan/ Result Policy    If we have ordered testing for you, know that; [facility] NEWS IS GOOD NEWS! \" It is our policy that we know longer call patients with results, nor do we  give test results over the phone. We schedule follow up appointments so that your results can be discussed in person. This allows you to address any questions you have regarding the results. If something of concern is revealed on your test, we will contact you to discuss the matter and if needed schedule a sooner follow up appointment. Additionally, results may be found by using the My Chart feature and one of our patient service representatives at the  can give you instructions on how to access this feature to utilize our electronic medical record system. Thank you for your understanding. Topamax 50mg tabs  Take 1/2 tab at night for one week then  Take one tab at night for one week then  Take one and 1/2  tabs at night for one week then  Take two tabs at night    If you have issues with this medication or need an increase in the dosage please call the office for further instructions    Topiramate (By mouth)   Topiramate (toe-PIR-a-mate)  Treats and prevents seizures, and helps prevent migraine headaches. Brand Name(s): Qudexy XR, Topamax, Trokendi XR   There may be other brand names for this medicine. When This Medicine Should Not Be Used: This medicine is not right for everyone. Do not use it if you had an allergic reaction to topiramate, or if you are pregnant. How to Use This Medicine:   Capsule, Long Acting Capsule, Tablet  · Take your medicine as directed. Your dose may need to be changed several times to find what works best for you. · Tablet: Swallow whole. Do not break, crush, or chew the tablet. It has a very bitter taste. · Capsule or extended-release capsule: Do not crush or chew the capsule. Swallow whole or open the capsule and pour the medicine into a small amount (1 teaspoon) of soft food, such as applesauce. Swallow the mixture right away without chewing. Do not store the mixture for use at a later time.   · Drink extra fluids so you will urinate more often and help prevent kidney problems. · This medicine should come with a Medication Guide. Ask your pharmacist for a copy if you do not have one. · Missed dose: Take a dose as soon as you remember. If it is almost time for your next dose, wait until then and take a regular dose. Do not take extra medicine to make up for a missed dose. If you miss a dose or forget to use your medicine, use it as soon as you can. If your next regular dose of Topamax® is less than 6 hours away, wait until then to use the medicine and skip the missed dose. If you miss more than 1 dose of Topamax®, call your doctor for instructions. · Store the medicine in a closed container at room temperature, away from heat, moisture, and direct light. Drugs and Foods to Avoid:   Ask your doctor or pharmacist before using any other medicine, including over-the-counter medicines, vitamins, and herbal products. · Do not drink alcohol with Qudexy XR or Topamax®. Do not drink alcohol for 6 hours before and 6 hours after you take the Trokendi XR capsule. · Some medicines can affect how topiramate works. Tell your doctor if you are using acetazolamide, dichlorphenamide, dichloralphenazone, digoxin, lithium, metformin, zonisamide, other medicine for seizures (such as carbamazepine, phenytoin, valproic acid), or birth control pills. · Tell your doctor if you are using any medicine that makes you sleepy, such as allergy medicine or narcotic pain medicine. Warnings While Using This Medicine:   · It is not safe to take this medicine during pregnancy. It could harm an unborn baby. Tell your doctor right away if you become pregnant. · Tell your doctor if you are breastfeeding, or if you have kidney disease, liver disease, glaucoma, lung or breathing problems, osteoporosis, or a history of depression or mood disorders. Tell your doctor if you are on a ketogenic diet (high in fat and low in carbohydrates).   · This medicine may cause the following problems:  ¨ Eye pain or vision changes, including glaucoma  ¨ Changes in body temperature  ¨ Metabolic acidosis (too much acid in the blood)  ¨ Kidney stones  · This medicine may increase depression or thoughts of suicide. Tell your doctor right away if you start to feel more depressed or think about hurting yourself. · This medicine may make you dizzy, drowsy, or tired. Do not drive or do anything else that could be dangerous until you know how this medicine affects you. · Do not stop using this medicine suddenly. Your doctor will need to slowly decrease your dose before you stop it completely. · Your doctor will do lab tests at regular visits to check on the effects of this medicine. Keep all appointments. · Keep all medicine out of the reach of children. Never share your medicine with anyone. Possible Side Effects While Using This Medicine:   Call your doctor right away if you notice any of these side effects:  · Allergic reaction: Itching or hives, swelling in your face or hands, swelling or tingling in your mouth or throat, chest tightness, trouble breathing  · Bloody or cloudy urine, painful urination, sudden lower back or stomach pain  · Changes in vision, eye pain  · Confusion, problems with walking, clumsiness, dizziness, or trouble talking, concentrating, or remembering  · Feeling agitated, depressed, nervous, or irritable, thoughts of hurting yourself or others, unusual mood or behavior  · Fever, decreased sweating  · Numbness, tingling, or burning pain in your hands, arms, legs, or feet  · Rapid, deep breathing, loss of appetite, fast or uneven heartbeat  · Vomiting, unusual drowsiness, tiredness, or weakness  If you notice these less serious side effects, talk with your doctor:   · Change in taste  · Nausea, diarrhea  · Stuffy or runny nose  · Weight loss  If you notice other side effects that you think are caused by this medicine, tell your doctor.    Call your doctor for medical advice about side effects. You may report side effects to FDA at 4-211-QUG-3289  © 2017 [facility] Information is for End User's use only and may not be sold, redistributed or otherwise used for commercial purposes. The above information is an  only. It is not intended as medical advice for individual conditions or treatments. Talk to your doctor, nurse or pharmacist before following any medical regimen to see if it is safe and effective for you.

## 2018-05-22 NOTE — PROGRESS NOTES
NEUROLOGY NEW PATIENT CONSULTATION    REFERRED BY:  Brian Sharpe MD    CHIEF COMPLAINT:  Migraines    HISTORY OF PRESENT ILLNESS    HISTORY PROVIDED BY:  Patient      Dominique Lane is a 44 y.o. female who I am asked to see in consultation for migraines. Patient has had migraines since age 12. Over the years they have changed somewhat in quality. She used to experience complicated migraine where she would have difficulty with speech, facial recognition, and numbness. Over time, however, she now mostly experiences more typical migraine with aura and pain. She has never had any neuroimaging. She is always been told she has classic migraine. Headache Characterization: throbbing and stabbing  Pain Level: 10/10  Aura: Yes, loss of peripheral vision  Frequency/Length: Can last up to 3 days and occurs about 15 days per month  Location: Always unilateral  Nausea/Vomiting: Yes  Photophobia: Yes  Phonophobia: Yes  Provoking factors: High-pitched noises, anxiety, bright lights, irregular meals  Relieving factors: Ductus, quiet, rest, scalp pressure  Focal neurologic symptoms with headache: No    Meds:  Prior abortive tx: Imitrex (injectable and nasal), Zomig tablets, Relpax, Maxalt, Fioricet  Prior preventative tx: Topamax    Current abortive tx: Maxalt and Fioricet  Current preventative tx: None    Family Hx of headaches/migraines: Father    Social:  Work: States at home with her 4 children (ages 9 twin 10year-olds, and 3year-old)  Caffeine: No  Tobacco use: No  Sleep habits: Will occasionally wake but overall sleeps okay  Exercise: Exercises regularly    Patient did correlate her menstrual cycle with headaches. She has been tried on birth control pills for about 1 year but has not noticed any significant benefit with this. She has seen neurology in the past.  She has lived in several cities. Typically she has seen a neurologist in each place she has been.     [facility]  Past Medical History: Diagnosis Date    Abnormal Pap smear     Colposcopy in 2002    Headache     migraines    Herpes simplex without mention of complication     Cold sores (Oct 2013)    Ill-defined condition     rosacia    Infertility     IVF with first 3 pregnancies, not this pregnancy    Unspecified breast disorder     biospy right breast,galactocele       SH  Social History     Social History    Marital status:      Spouse name: N/A    Number of children: N/A    Years of education: N/A     Social History Main Topics    Smoking status: Never Smoker    Smokeless tobacco: Never Used    Alcohol use 1.2 oz/week     1 Glasses of wine, 1 Cans of beer per week      Comment: rarely    Drug use: No    Sexual activity: Yes     Partners: Male     Birth control/ protection: Surgical      Comment: vasectomy     Other Topics Concern    Not on file     Social History Narrative    Wife of Dr. Trujillo Fairly    At home with the kids        4 children        2, 4, 4, 6        Exercise     Gym 3 times/week       FH  Family History   Problem Relation Age of Onset    Hypertension Father     Asthma Brother     Elevated Lipids Maternal Grandfather     Elevated Lipids Paternal Grandmother     Hypertension Paternal Grandmother        ALLERGIES  No Known Allergies    CURRENT MEDS  Current Outpatient Prescriptions   Medication Sig Dispense Refill    fluticasone (FLONASE ALLERGY RELIEF) 50 mcg/actuation nasal spray 2 Sprays by Both Nostrils route daily.  melatonin 5 mg cap capsule Take 5 mg by mouth nightly.  multivitamin (ONE A DAY) tablet Take 1 Tab by mouth daily.  ibuprofen (MOTRIN) 800 mg tablet Take 800 mg by mouth every eight (8) hours as needed for Pain.  topiramate (TOPAMAX) 50 mg tablet Take 2 Tabs by mouth nightly. 60 Tab 5    norethindrone-e.estradiol-iron (LO LOESTRIN FE) 1 mg-10 mcg (24)/10 mcg (2) tab Take 1 Tab by mouth daily.  3 Package 0    butalbital-acetaminophen-caffeine Andrzej Brock 52) -34 mg per tablet Take 1 Tab by mouth every six (6) hours as needed for Headache. 30 Tab 0    rizatriptan (MAXALT) 10 mg tablet Take 1 Tab by mouth once as needed for Migraine for up to 1 dose. May repeat in 2 hours if needed 12 Tab 0    busPIRone (BUSPAR) 10 mg tablet TAKE 1.5 TABS BY MOUTH TWO (2) TIMES A DAY. 270 Tab 3    valACYclovir (VALTREX) 1 gram tablet Take 2 tabs repeat in 12 hours then done per outbreak 4 tabs /each outbreak 12 Tab 1    zolpidem (AMBIEN) 5 mg tablet Take 1 Tab by mouth nightly as needed for Sleep. Max Daily Amount: 5 mg.  Do not take with fiorocet 15 Tab 0    FINACEA 15 % topical gel   3       REVIEW OF SYSTEMS:     Y  N       Y  N  Y  N   Y  N  [] [x] AIDS          [] [x] Falls  [] [x] Memory Loss  [] [x]  Shortness of breath  [x] [] Anxiety          [] [x] Fatigue [] [x] Muscle Pain        [] [x]  Skipped beats  [] [x] Chest Pain   [x] [] Frequent HA [] [x] Ms Weakness     [] [x]  Snoring  [] [x] Constipation [] [x]Hearing loss [] [x] Nause/Vomiting  [] [x]  Stomach Pain  [] [x] Cough          [] [x]Hepatitis [] [x] Neuropathy         [] [x]  Swallowing difficulty  [] [x] Depression  [] [x]Incontinence [] [x] Poor appetite      [] [x]  Vertigo  [] [x] Diarrhea       [] [x] Joint Pain [] [x] Rash                   [] [x]  Visual disturbances  [] [x] Fainting        [] [x] Leg Swelling [] [x] Ringing ears       [] [x]  Weight changes      []Unable to obtain  ROS due to  []mental status change  []sedated   []intubated          PREVIOUS WORKUP  IMAGING: None      LABS  Results for orders placed or performed in visit on 54/47/76   METABOLIC PANEL, COMPREHENSIVE   Result Value Ref Range    Glucose 91 65 - 99 mg/dL    BUN 13 6 - 20 mg/dL    Creatinine 0.61 0.57 - 1.00 mg/dL    GFR est non- >59 mL/min/1.73    GFR est  >59 mL/min/1.73    BUN/Creatinine ratio 21 (H) 8 - 20    Sodium 141 134 - 144 mmol/L    Potassium 4.0 3.5 - 5.2 mmol/L    Chloride 99 97 - 108 mmol/L    CO2 24 18 - 29 mmol/L    Calcium 9.3 8.7 - 10.2 mg/dL    Protein, total 6.7 6.0 - 8.5 g/dL    Albumin 4.8 3.5 - 5.5 g/dL    GLOBULIN, TOTAL 1.9 1.5 - 4.5 g/dL    A-G Ratio 2.5 1.1 - 2.5    Bilirubin, total 0.2 0.0 - 1.2 mg/dL    Alk. phosphatase 52 39 - 117 IU/L    AST (SGOT) 16 0 - 40 IU/L    ALT (SGPT) 13 0 - 32 IU/L   CBC W/O DIFF   Result Value Ref Range    WBC 6.5 3.4 - 10.8 x10E3/uL    RBC 4.92 3.77 - 5.28 x10E6/uL    HGB 14.1 11.1 - 15.9 g/dL    HCT 42.9 34.0 - 46.6 %    MCV 87 79 - 97 fL    MCH 28.7 26.6 - 33.0 pg    MCHC 32.9 31.5 - 35.7 g/dL    RDW 14.4 12.3 - 15.4 %    PLATELET 026 059 - 102 x10E3/uL   LIPID PANEL   Result Value Ref Range    Cholesterol, total 177 100 - 199 mg/dL    Triglyceride 125 0 - 149 mg/dL    HDL Cholesterol 75 >39 mg/dL    VLDL, calculated 25 5 - 40 mg/dL    LDL, calculated 77 0 - 99 mg/dL   TSH, 3RD GENERATION   Result Value Ref Range    TSH 1.810 0.450 - 4.500 uIU/mL   VITAMIN D, 25 HYDROXY   Result Value Ref Range    VITAMIN D, 25-HYDROXY 21.1 (L) 30.0 - 100.0 ng/mL   FERRITIN   Result Value Ref Range    Ferritin 15 15 - 150 ng/mL   CVD REPORT   Result Value Ref Range    INTERPRETATION Note        PHYSICAL EXAM  Visit Vitals    /80    Pulse 82    Ht 5' 9\" (1.753 m)    Wt 60.3 kg (133 lb)    LMP 05/06/2018    SpO2 97%    BMI 19.64 kg/m2     General:  Alert, cooperative, no distress. Head:  Normocephalic, without obvious abnormality, atraumatic. Eyes:  Conjunctivae/corneas clear. Pupils equal, round, reactive to light. Extraocular movements intact, VFF, NO papilledema   Lungs:  Heart:   Non labored breathing  Regular rate and rhythm, no carotid bruits   Abdomen:   Soft, non-distended   Extremities: Extremities normal, atraumatic, no cyanosis or edema. Pulses: 2+ and symmetric all extremities. Skin: Skin color, texture, turgor normal. No rashes or lesions.    Neurologic:  Gen: Attention normal             Language: naming, repetition, fluency normal             Memory: intact recent and remote memory  Cranial Nerves:  I: smell Not tested   II: visual fields Full to confrontation   II: pupils Equal, round, reactive to light   II: optic disc No papilledema   III,VII: ptosis none   III,IV,VI: extraocular muscles  Full ROM   V: mastication normal   V: facial light touch sensation  normal   VII: facial muscle function   symmetric   VIII: hearing symmetric   IX: soft palate elevation  normal   XI: trapezius strength  5/5   XI: sternocleidomastoid strength 5/5   XI: neck flexion strength  5/5   XII: tongue  midline     Motor: normal bulk and tone, no tremor              Strength: 5/5 all four extremities  Sensory: intact to LT, PP, vibration, and temperature  Coordination: FTN intact, Rhomberg negative  Gait: normal gait including tandem   Reflexes: 2+ throughout       IMPRESSION  Mukesh Huang is a 44 y.o. female who presents for evaluation of migraine headaches. Patient has migraine with aura. She has a history of comp get a migraine but no longer suffers from this. For abortive she uses Maxalt and Fioricet. These work well. Given that she is having so many migraine days per month, I think she would benefit from a preventative. We discussed several options for this but since patient did well with Topamax in the past will proceed with this choice. RECOMMENDATIONS    Problem List Items Addressed This Visit     Migraine - Primary    Relevant Medications    · topiramate (TOPAMAX) 50 mg tablet-we will start 25 mg and titrate up to 100.   · Discussed Trokendi XR as an option if she has any side effects from the generic Topamax  · Migraine book given  · Encouraged migraine diary  · Abortive with Maxalt and Fioricet (she is done well with all triptan's but these are her current prescriptions)  · No neuroimaging needed at this time  · Encouraged patient to stop her birth control pill if she is not getting benefit from a headache perspective and does not need for any other reason          FU 3 months    Tamica Roe MD    CC: Ravin Jean MD  Fax: [phone]    Medications and side effects discussed with patient in detail. With any new medications prescribed, patient was given instructions on administration and side effects. Written medication information was provided to the patient as well. This note was created using voice recognition software. Despite editing, there may be syntax errors. This note will not be viewable in [address]

## 2018-07-30 RX ORDER — NORETHINDRONE ACETATE AND ETHINYL ESTRADIOL, ETHINYL ESTRADIOL AND FERROUS FUMARATE 1MG-10(24)
KIT ORAL
Qty: 1 PACKAGE | Refills: 0 | Status: SHIPPED | OUTPATIENT
Start: 2018-07-30 | End: 2019-07-08 | Stop reason: ALTCHOICE

## 2018-09-07 ENCOUNTER — TELEPHONE (OUTPATIENT)
Dept: NEUROLOGY | Age: 38
End: 2018-09-07

## 2018-09-07 NOTE — TELEPHONE ENCOUNTER
----- Message from Mayo Balbuena sent at 9/7/2018  8:59 AM EDT -----  Regarding: Dr. Kyle Ritter  Pt stated the medication prescribed did not work and was advised to call to let the doctor and another medication would be called in to the pharmacy. Pt stated she has an appt on 09/13/18 and would like to know if the medication is going to be changed should she keep the appt. Best contact number [phone].

## 2018-09-10 NOTE — TELEPHONE ENCOUNTER
I believe we also talked about the options of trying Inderal or amitriptyline. I think Inderal would be the next best option. It is a blood pressure medicine that could slow her heart rate down, but her heart rate in our office visit was 82. She should take it at bedtime as it may make her slightly tired when she first starts it. It is a once a day medication. I would like her to monitor her pulse for the first week or 2 that she is taking it to make sure that her pulse was not dropping below 60.   If she is okay with taking this medication, please call in a prescription for Inderal LA 60 mg nightly

## 2018-09-11 RX ORDER — PROPRANOLOL HYDROCHLORIDE 60 MG/1
60 TABLET ORAL
Qty: 30 TAB | Refills: 5 | Status: SHIPPED | OUTPATIENT
Start: 2018-09-11 | End: 2019-07-08 | Stop reason: ALTCHOICE

## 2018-09-11 NOTE — TELEPHONE ENCOUNTER
Order placed for Inderal 60 mg tablet at bedtime PO, per Verbal Order from Dr. Robin Boothe on 9/11/2018 due to migraine.

## 2018-09-11 NOTE — TELEPHONE ENCOUNTER
Called and spoke with patient per Dr. Muriel Culver note and patient will try the Inderal 60 mg. She stated understanding of checking her pulse to make sure it does not go below 60.

## 2019-02-15 ENCOUNTER — PATIENT MESSAGE (OUTPATIENT)
Dept: INTERNAL MEDICINE CLINIC | Age: 39
End: 2019-02-15

## 2019-02-15 DIAGNOSIS — F41.1 GENERALIZED ANXIETY DISORDER: ICD-10-CM

## 2019-02-15 RX ORDER — BUSPIRONE HYDROCHLORIDE 10 MG/1
TABLET ORAL
Qty: 90 TAB | Refills: 0 | Status: SHIPPED | OUTPATIENT
Start: 2019-02-15 | End: 2019-05-17 | Stop reason: SDUPTHER

## 2019-05-17 ENCOUNTER — TELEPHONE (OUTPATIENT)
Dept: INTERNAL MEDICINE CLINIC | Age: 39
End: 2019-05-17

## 2019-05-17 DIAGNOSIS — F41.1 GENERALIZED ANXIETY DISORDER: ICD-10-CM

## 2019-05-17 RX ORDER — BUSPIRONE HYDROCHLORIDE 10 MG/1
TABLET ORAL
Qty: 90 TAB | Refills: 0 | Status: SHIPPED | OUTPATIENT
Start: 2019-05-17 | End: 2019-06-28 | Stop reason: SDUPTHER

## 2019-05-17 NOTE — TELEPHONE ENCOUNTER
Werner Teran ac .S. [facility]                Patient needs refill for Buspirone 10 mg. Pharmacy is [facility] on [address] contact number is [phone].

## 2019-06-28 DIAGNOSIS — F41.1 GENERALIZED ANXIETY DISORDER: ICD-10-CM

## 2019-06-28 RX ORDER — BUSPIRONE HYDROCHLORIDE 10 MG/1
TABLET ORAL
Qty: 60 TAB | Refills: 0 | Status: SHIPPED | OUTPATIENT
Start: 2019-06-28 | End: 2019-07-08 | Stop reason: SDUPTHER

## 2019-06-28 NOTE — TELEPHONE ENCOUNTER
Patient states she is completely out of Buspirone and requesting a refill to be sent to [facility]. Next appointment is 7/8/19. Thanks.

## 2019-07-08 ENCOUNTER — OFFICE VISIT (OUTPATIENT)
Dept: INTERNAL MEDICINE CLINIC | Age: 39
End: 2019-07-08

## 2019-07-08 VITALS
HEIGHT: 69 IN | BODY MASS INDEX: 21.27 KG/M2 | TEMPERATURE: 97.8 F | SYSTOLIC BLOOD PRESSURE: 117 MMHG | DIASTOLIC BLOOD PRESSURE: 78 MMHG | RESPIRATION RATE: 12 BRPM | HEART RATE: 71 BPM | WEIGHT: 143.6 LBS | OXYGEN SATURATION: 99 %

## 2019-07-08 DIAGNOSIS — F41.1 GENERALIZED ANXIETY DISORDER: ICD-10-CM

## 2019-07-08 DIAGNOSIS — Z12.39 BREAST CANCER SCREENING: Primary | ICD-10-CM

## 2019-07-08 DIAGNOSIS — L71.9 ROSACEA: ICD-10-CM

## 2019-07-08 DIAGNOSIS — G43.819 OTHER MIGRAINE WITHOUT STATUS MIGRAINOSUS, INTRACTABLE: ICD-10-CM

## 2019-07-08 DIAGNOSIS — B00.9 HSV-1 INFECTION: ICD-10-CM

## 2019-07-08 RX ORDER — SPIRONOLACTONE 50 MG/1
50 TABLET, FILM COATED ORAL DAILY
COMMUNITY
End: 2020-05-12

## 2019-07-08 RX ORDER — BUSPIRONE HYDROCHLORIDE 10 MG/1
TABLET ORAL
Qty: 270 TAB | Refills: 2 | Status: SHIPPED | OUTPATIENT
Start: 2019-07-08 | End: 2020-04-14

## 2019-07-08 RX ORDER — VALACYCLOVIR HYDROCHLORIDE 1 G/1
TABLET, FILM COATED ORAL
Qty: 12 TAB | Refills: 1 | Status: SHIPPED | OUTPATIENT
Start: 2019-07-08 | End: 2019-11-27 | Stop reason: SDUPTHER

## 2019-07-08 RX ORDER — ELETRIPTAN HYDROBROMIDE 40 MG/1
TABLET, FILM COATED ORAL
Qty: 12 TAB | Refills: 1 | Status: SHIPPED | OUTPATIENT
Start: 2019-07-08 | End: 2021-02-19 | Stop reason: SDUPTHER

## 2019-07-08 RX ORDER — BUTALBITAL, ACETAMINOPHEN AND CAFFEINE 50; 325; 40 MG/1; MG/1; MG/1
TABLET ORAL
Qty: 30 TAB | Refills: 0 | Status: SHIPPED | OUTPATIENT
Start: 2019-07-08 | End: 2020-08-21 | Stop reason: SDUPTHER

## 2019-07-08 NOTE — PROGRESS NOTES
Chief Complaint   Patient presents with    Medication Refill     Patient presents with her 3 sons and daughter. She is here for follow-up with regards to her migraines and DAVID. Migraines  Patient reports she was seeing Dr. Betsy Mercado. She was prescribed Topamax as preventive medicine but her hair was falling out. She stopped the Topamax. She notices her headaches are coming about every other month. She notes that 202-206 Genticel works for some headaches and the Relpax works for others. She is not getting her headaches as frequently as discussed with Dr. Betsy Mercado. Anxiety  Patient reports the BuSpar is working well for her. She is sleeping better. She is taking 15 mg twice a day. Patient finds time to exercise by cycling. Cold sores  Patient has outbreaks once every 3 months. Rosacea  Patient reports that she is on spinal lactone and finasteride. She denies lightheaded or dizziness.       Musculoskeletal pain  Resolved    Past Medical History:   Diagnosis Date    Abnormal Pap smear     Colposcopy in     Headache     migraines    Herpes simplex without mention of complication     Cold sores (Oct 2013)    Ill-defined condition     rosacia    Infertility     IVF with first 3 pregnancies, not this pregnancy    Unspecified breast disorder     biospy right breast,galactocele     Past Surgical History:   Procedure Laterality Date     DELIVERY ONLY      Twins    EXPLORATORY OF ABDOMEN      For infertility    HX BREAST AUGMENTATION Bilateral 2015    BILATERAL BREAST AUGMENTATION W RIGHT BREAST PERIAREOLAR MASTOPEXY SILICONE  performed [facility] Amauri Michel [facility] at [facility] MAIN OR    HX BREAST AUGMENTATION Bilateral 2015    [facility] HAIR- Nahid Deluna    HX BREAST REDUCTION Right 2015    BREAST MASTOPEXY performed by Amauri Michel MD at [address] [place] [address] HX  SECTION      HX  SECTION      HX LEEP PROCEDURE      HX OTHER SURGICAL  2009    Laproscopy for Infertility    HX OTHER SURGICAL  2000    LEEP    HX OTHER SURGICAL  2013    D&C     Social History     Socioeconomic History    Marital status:      Spouse name: Not on file    Number of children: Not on file    Years of education: Not on file    Highest education level: Not on file   Tobacco Use    Smoking status: Never Smoker    Smokeless tobacco: Never Used   Substance and Sexual Activity    Alcohol use: Yes     Alcohol/week: 1.2 oz     Types: 1 Glasses of wine, 1 Cans of beer per week     Comment: rarely    Drug use: No    Sexual activity: Yes     Partners: Male     Birth control/protection: Surgical     Comment: vasectomy   Social History Narrative    Wife of Dr. Kailee Kilgore    At home with the kids        4 children        2, 4, 4, 6        Exercise     Gym 3 times/week     Family History   Problem Relation Age of Onset    Hypertension Father     Asthma Brother     Elevated Lipids Maternal Grandfather     Elevated Lipids Paternal Grandmother     Hypertension Paternal Grandmother      Current Outpatient Medications   Medication Sig Dispense Refill    spironolactone (ALDACTONE) 50 mg tablet Take  by mouth daily.  eletriptan (RELPAX) 40 mg tablet may repeat in 2 hours if necessary. Max 80 mg/day. Do not take with fiorcet 12 Tab 1    butalbital-acetaminophen-caffeine (FIORICET, ESGIC) -40 mg per tablet Take 1.5 tabs po only as needed daily for migraine  Indications: migraine headache 30 Tab 0    busPIRone (BUSPAR) 10 mg tablet TAKE 1.5 TABS BY MOUTH TWO (2) TIMES A DAY. 270 Tab 2    valACYclovir (VALTREX) 1 gram tablet Take 2 tabs repeat in 12 hours then done per outbreak 4 tabs /each outbreak 12 Tab 1    fluticasone (FLONASE ALLERGY RELIEF) 50 mcg/actuation nasal spray 2 Sprays by Both Nostrils route as needed.  multivitamin (ONE A DAY) tablet Take 1 Tab by mouth daily.  ibuprofen (MOTRIN) 800 mg tablet Take 800 mg by mouth every eight (8) hours as needed for Pain.       FINACEA 15 % topical gel   3     No Known Allergies    Review of Systems - General ROS: negative for - chills, fatigue, fever or hot flashes  Cardiovascular ROS: no chest pain or dyspnea on exertion  Respiratory ROS: no cough, shortness of breath, or wheezing    Visit Vitals  /78 (BP 1 Location: Left arm, BP Patient Position: Sitting)   Pulse 71   Temp 97.8 °F (36.6 °C) (Oral)   Resp 12   Ht 5' 9\" (1.753 m)   Wt 143 lb 9.6 oz (65.1 kg)   LMP 06/24/2019 (Approximate)   SpO2 99%   BMI 21.21 kg/m²     General Appearance:  Well developed, well nourished,alert and oriented x 3, and individual in no acute distress. Ears/Nose/Mouth/Throat:   Hearing grossly normal.         Neck: Supple, no lad, no bruits   Chest:   Lungs clear to auscultation bilaterally. Cardiovascular:  Regular rate and rhythm, S1, S2 normal, no murmur. Abdomen:   Soft, non-tender, bowel sounds are active. Extremities: No edema bilaterally. Skin: Warm and dry, no suspicious lesions                 Diagnoses and all orders for this visit:    1. Breast cancer screening  -     [facility] 3D YASEMIN W MAMMO BI SCREENING INCL CAD; Future    2. Other migraine without status migrainosus, intractable  These appear to be stable in nature  Refill meds  -     eletriptan (RELPAX) 40 mg tablet; may repeat in 2 hours if necessary. Max 80 mg/day. Do not take with fiorcet  -     butalbital-acetaminophen-caffeine (FIORICET, ESGIC) -40 mg per tablet; Take 1.5 tabs po only as needed daily for migraine  Indications: migraine headache    3. Generalized anxiety disorder  Stable  -     busPIRone (BUSPAR) 10 mg tablet; TAKE 1.5 TABS BY MOUTH TWO (2) TIMES A DAY. 4. HSV-1 infection  -     valACYclovir (VALTREX) 1 gram tablet; Take 2 tabs repeat in 12 hours then done per outbreak 4 tabs /each outbreak    5. Rosacea  Patient needs labs for her spinal lactone  She is also due for her cholesterol level check  -     LIPID PANEL;  Future  -     METABOLIC PANEL, COMPREHENSIVE; Future    Patient will follow-up for her annual.

## 2019-08-08 DIAGNOSIS — L71.9 ROSACEA: ICD-10-CM

## 2019-08-30 ENCOUNTER — OFFICE VISIT (OUTPATIENT)
Dept: INTERNAL MEDICINE CLINIC | Age: 39
End: 2019-08-30

## 2019-08-30 VITALS
WEIGHT: 137.5 LBS | RESPIRATION RATE: 18 BRPM | DIASTOLIC BLOOD PRESSURE: 80 MMHG | SYSTOLIC BLOOD PRESSURE: 127 MMHG | HEART RATE: 70 BPM | OXYGEN SATURATION: 97 % | HEIGHT: 69 IN | TEMPERATURE: 98.4 F | BODY MASS INDEX: 20.37 KG/M2

## 2019-08-30 DIAGNOSIS — S62.603A CLOSED NONDISPLACED FRACTURE OF PHALANX OF LEFT MIDDLE FINGER, UNSPECIFIED PHALANX, INITIAL ENCOUNTER: ICD-10-CM

## 2019-08-30 DIAGNOSIS — M25.542 ARTHRALGIA OF BOTH HANDS: Primary | ICD-10-CM

## 2019-08-30 DIAGNOSIS — M25.541 ARTHRALGIA OF BOTH HANDS: Primary | ICD-10-CM

## 2019-08-30 DIAGNOSIS — E55.9 VITAMIN D DEFICIENCY: ICD-10-CM

## 2019-08-30 DIAGNOSIS — M19.049 HAND ARTHROPATHY: ICD-10-CM

## 2019-08-30 NOTE — PATIENT INSTRUCTIONS
Arthritis: Care Instructions  Your Care Instructions  Arthritis, also called osteoarthritis, is a breakdown of the cartilage that cushions your joints. When the cartilage wears down, your bones rub against each other. This causes pain and stiffness. Many people have some arthritis as they age. Arthritis most often affects the joints of the spine, hands, hips, knees, or feet. You can take simple measures to protect your joints, ease your pain, and help you stay active. Follow-up care is a key part of your treatment and safety. Be sure to make and go to all appointments, and call your doctor if you are having problems. It's also a good idea to know your test results and keep a list of the medicines you take. How can you care for yourself at home? · Stay at a healthy weight. Being overweight puts extra strain on your joints. · Talk to your doctor or physical therapist about exercises that will help ease joint pain. ? Stretch. You may enjoy gentle forms of yoga to help keep your joints and muscles flexible. ? Walk instead of jog. Other types of exercise that are less stressful on the joints include riding a bicycle, swimming, chiquis chi, or water exercise. ? Lift weights. Strong muscles help reduce stress on your joints. Stronger thigh muscles, for example, take some of the stress off of the knees and hips. Learn the right way to lift weights so you do not make joint pain worse. · Take your medicines exactly as prescribed. Call your doctor if you think you are having a problem with your medicine. · Take pain medicines exactly as directed. ? If the doctor gave you a prescription medicine for pain, take it as prescribed. ? If you are not taking a prescription pain medicine, ask your doctor if you can take an over-the-counter medicine. · Use a cane, crutch, walker, or another device if you need help to get around. These can help rest your joints.  You also can use other things to make life easier, such as a higher toilet seat and padded handles on kitchen utensils. · Do not sit in low chairs, which can make it hard to get up. · Put heat or cold on your sore joints as needed. Use whichever helps you most. You also can take turns with hot and cold packs. ? Apply heat 2 or 3 times a day for 20 to 30 minutes--using a heating pad, hot shower, or hot pack--to relieve pain and stiffness. ? Put ice or a cold pack on your sore joint for 10 to 20 minutes at a time. Put a thin cloth between the ice and your skin. When should you call for help? Call your doctor now or seek immediate medical care if:    · You have sudden swelling, warmth, or pain in any joint.     · You have joint pain and a fever or rash.     · You have such bad pain that you cannot use a joint.    Watch closely for changes in your health, and be sure to contact your doctor if:    · You have mild joint symptoms that continue even with more than 6 weeks of care at home.     · You have stomach pain or other problems with your medicine. Where can you learn more? Go to http://[facility] CONSULTINGdebbi.info/. Enter R735 in the search box to learn more about \"Arthritis: Care Instructions. \"  Current as of: April 1, 2019  Content Version: 12.1  © 9455-2966 [employer]. Care instructions adapted under license by [employer] (which disclaims liability or warranty for this information). If you have questions about a medical condition or this instruction, always ask your healthcare professional. [place] [address] any warranty or liability for your use of this information. Hand Arthritis: Exercises  Introduction  Here are some examples of exercises for you to try. The exercises may be suggested for a condition or for rehabilitation. Start each exercise slowly. Ease off the exercises if you start to have pain. You will be told when to start these exercises and which ones will work best for you.   How to do the exercises  Tendon glides    1. In this exercise, the steps follow one another to a make a continuous movement. 2. With your affected hand, point your fingers and thumb straight up. Your wrist should be relaxed, following the line of your fingers and thumb. 3. Curl your fingers so that the top two joints in them are bent, and your fingers wrap down. Your fingertips should touch or be near the base of your fingers. Your fingers will look like a hook. 4. Make a fist by bending your knuckles. Your thumb can gently rest against your index (pointing) finger. 5. Unwind your fingers slightly so that your fingertips can touch the base of your palm. Your thumb can rest against your index finger. 6. Move back to your starting position, with your fingers and thumb pointing up. 7. Repeat the series of motions 8 to 12 times. 8. Switch hands and repeat steps 1 through 6, even if only one hand is sore. Intrinsic flexion    1. Rest your affected hand on a table and bend the large joints where your fingers connect to your hand. Keep your thumb and the other joints in your fingers straight. 2. Slowly straighten your fingers. Your wrist should be relaxed, following the line of your fingers and thumb. 3. Move back to your starting position, with your hand bent. 4. Repeat 8 to 12 times. 5. Switch hands and repeat steps 1 through 4, even if only one hand is sore. Finger extension    1. Place your affected hand flat on a table. 2. Lift and then lower one finger at a time off the table. 3. Repeat 8 to 12 times. 4. Switch hands and repeat steps 1 through 3, even if only one hand is sore. MP extension    1. Place your good hand on a table, palm up.  Put your affected hand on top of your good hand with your fingers wrapped around the thumb of your good hand like you are making a fist.  2. Slowly uncurl the joints of your affected hand where your fingers connect to your hand so that only the top two joints of your fingers are bent. Your fingers will look like a hook. 3. Move back to your starting position, with your fingers wrapped around your good thumb. 4. Repeat 8 to 12 times. 5. Switch hands and repeat steps 1 through 4, even if only one hand is sore. PIP extension (with MP extension)    1. Place your good hand on a table, palm up. Put your affected hand on top of your good hand, palm up. 2. Use the thumb and fingers of your good hand to grasp below the middle joint of one finger of your affected hand. 3. Straighten the last two joints of that finger. 4. Repeat 8 to 12 times. 5. Repeat steps 1 through 4 with each finger. 6. Switch hands and repeat steps 1 through 5, even if only one hand is sore. DIP flexion    1. With your good hand, grasp one finger of your affected hand. Your thumb will be on the top side of your finger just below the joint that is closest to your fingernail. 2. Slowly bend your affected finger only at the joint closest to your fingernail. 3. Repeat 8 to 12 times. 4. Repeat steps 1 through 3 with each finger. 5. Switch hands and repeat steps 1 through 4, even if only one hand is sore. Follow-up care is a key part of your treatment and safety. Be sure to make and go to all appointments, and call your doctor if you are having problems. It's also a good idea to know your test results and keep a list of the medicines you take. Where can you learn more? Go to http://hari-debbi.info/. Enter T699 in the search box to learn more about \"Hand Arthritis: Exercises. \"  Current as of: September 20, 2018  Content Version: 12.1  © 6932-5204 [employer]. Care instructions adapted under license by [employer] (which disclaims liability or warranty for this information).  If you have questions about a medical condition or this instruction, always ask your healthcare professional. [address] any warranty or liability for your use of this information.

## 2019-08-30 NOTE — PROGRESS NOTES
HISTORY OF PRESENT ILLNESS  Minh Paris is a 35 y.o. female. HPI  Presents with complaints of bilateral hand stiffness that has been gradually increasing over the past 2 months. Reports hands feel weak and stiff upon awakening in am and improves over the next 10-15 minutes. Has not noted any erythema, edema or warmth to hands/fingers. Was diagnosed with nondisplaced fracture to left middle finger after she fell backwards 3 weeks ago; was seen by Dr Trung Varghese with [facility] and was told she has arthritis in her hands. She is concerned about possible inflammatory or autoimmune diseases and is requesting labs to rule out inflammatory issues. Denies family history of RA or AI disorders. Has noted increased difficulty with twisting movements especially when trying to open jars. Denies numbness, tingling into hands or fingers. Has been Vitamin D deficient in the past and is not currently taking any supplementation for this.        Patient Active Problem List   Diagnosis Code    Twins [id number]    Vaginal bleeding in pregnancy O46.90    Placenta previa with hemorrhage in third trimester O44.13    Threatened  labor O47.00    Unspecified breast disorder N64.9    Migraine G43.909     Past Surgical History:   Procedure Laterality Date     DELIVERY ONLY      Twins    EXPLORATORY OF ABDOMEN      For infertility    HX BREAST AUGMENTATION Bilateral 2015    BILATERAL BREAST AUGMENTATION W RIGHT BREAST PERIAREOLAR MASTOPEXY SILICONE  performed by Cyndi Tearn MD at [facility] MAIN OR    HX BREAST AUGMENTATION Bilateral 2015    [address]    HX BREAST REDUCTION Right 2015    BREAST MASTOPEXY performed by Cyndi Teran MD at [address] [place] [address] HX  SECTION      HX  SECTION      HX LEEP PROCEDURE      HX OTHER SURGICAL      Laproscopy for Infertility    HX OTHER SURGICAL      LEEP    HX OTHER SURGICAL      D&C     Social History Socioeconomic History    Marital status:      Spouse name: Not on file    Number of children: Not on file    Years of education: Not on file    Highest education level: Not on file   Occupational History    Not on file   Social Needs    Financial resource strain: Not on file    Food insecurity:     Worry: Not on file     Inability: Not on file    Transportation needs:     Medical: Not on file     Non-medical: Not on file   Tobacco Use    Smoking status: Never Smoker    Smokeless tobacco: Never Used   Substance and Sexual Activity    Alcohol use:  Yes     Alcohol/week: 2.0 standard drinks     Types: 1 Glasses of wine, 1 Cans of beer per week     Comment: rarely    Drug use: No    Sexual activity: Yes     Partners: Male     Birth control/protection: Surgical     Comment: vasectomy   Lifestyle    Physical activity:     Days per week: Not on file     Minutes per session: Not on file    Stress: Not on file   Relationships    Social connections:     Talks on phone: Not on file     Gets together: Not on file     Attends [employer] service: Not on file     Active member of club or organization: Not on file     Attends meetings of clubs or organizations: Not on file     Relationship status: Not on file    Intimate partner violence:     Fear of current or ex partner: Not on file     Emotionally abused: Not on file     Physically abused: Not on file     Forced sexual activity: Not on file   Other Topics Concern    Not on file   Social History Narrative    Wife of Dr. Rahman Dines    At home with the kids        4 children        2, 4, 4, 6        Exercise     Gym 3 times/week     Family History   Problem Relation Age of Onset    Hypertension Father     Asthma Brother     Elevated Lipids Maternal Grandfather     Elevated Lipids Paternal Grandmother     Hypertension Paternal Grandmother      Current Outpatient Medications   Medication Sig    eletriptan (RELPAX) 40 mg tablet may repeat in 2 hours if necessary. Max 80 mg/day. Do not take with fiorcet    butalbital-acetaminophen-caffeine (FIORICET, ESGIC) -40 mg per tablet Take 1.5 tabs po only as needed daily for migraine  Indications: migraine headache    busPIRone (BUSPAR) 10 mg tablet TAKE 1.5 TABS BY MOUTH TWO (2) TIMES A DAY.  valACYclovir (VALTREX) 1 gram tablet Take 2 tabs repeat in 12 hours then done per outbreak 4 tabs /each outbreak    fluticasone [facility] ALLERGY RELIEF) 50 mcg/actuation nasal spray 2 Sprays by Both Nostrils route as needed.  multivitamin (ONE A DAY) tablet Take 1 Tab by mouth daily.  ibuprofen (MOTRIN) 800 mg tablet Take 800 mg by mouth every eight (8) hours as needed for Pain.  FINACEA 15 % topical gel     spironolactone (ALDACTONE) 50 mg tablet Take 50 mg by mouth daily. No current facility-administered medications for this visit. No Known Allergies  Immunization History   Administered Date(s) Administered    Influenza Vaccine 11/24/2014    Influenza Vaccine (Quad) PF 10/20/2015       Review of Systems   Constitutional: Negative for chills and fever. HENT: Negative for sore throat. Respiratory: Negative for cough and shortness of breath. Cardiovascular: Negative for chest pain and palpitations. Gastrointestinal: Negative for nausea and vomiting. Musculoskeletal: Positive for joint pain (bilateral hand stiffness). Skin: Negative for rash. Neurological: Negative for dizziness, tingling and headaches. /80 (BP 1 Location: Left arm, BP Patient Position: Sitting)   Pulse 70   Temp 98.4 °F (36.9 °C) (Oral)   Resp 18   Ht 5' 9\" (1.753 m)   Wt 137 lb 8 oz (62.4 kg)   SpO2 97%   BMI 20.31 kg/m²   Physical Exam   Constitutional: She is oriented to person, place, and time. She appears well-developed and well-nourished. HENT:   Head: Normocephalic and atraumatic. Neck: Normal range of motion. Neck supple. No thyromegaly present.    Cardiovascular: Normal rate and regular rhythm. Pulmonary/Chest: Effort normal and breath sounds normal. She has no wheezes. Musculoskeletal:   Immobilizer splint to left 3rd finger; no inflammation or edema noted to either hand, wrist or fingers. Mild tenderness to left index finger. Lymphadenopathy:     She has no cervical adenopathy. Neurological: She is alert and oriented to person, place, and time. Skin: Skin is warm and dry. No rash noted. Psychiatric: She has a normal mood and affect. Her behavior is normal.   Nursing note and vitals reviewed. ASSESSMENT and PLAN  Diagnoses and all orders for this visit:    1. Arthralgia of both hands   -     TSH 3RD GENERATION  -     VITAMIN D, 25 HYDROXY  -     VITAMIN B12  -     RA + CCP ABS  -     REJI, DIRECT, W/REFLEX  -     SED RATE (ESR)  -     C REACTIVE PROTEIN, QT    2. Hand arthropathy    3.  Vitamin D deficiency  -     VITAMIN D, 25 HYDROXY    4. Closed nondisplaced fracture of phalanx of left middle finger, unspecified phalanx, initial encounter -- currently in splint and being followed by Orthopedics      lab results and schedule of future lab studies reviewed with patient  reviewed diet, exercise and weight control  reviewed medications and side effects in detail

## 2019-09-04 LAB
ALBUMIN SERPL-MCNC: 4.4 G/DL (ref 3.5–5.5)
ALBUMIN/GLOB SERPL: 2.3 {RATIO} (ref 1.2–2.2)
ALP SERPL-CCNC: 48 IU/L (ref 39–117)
ALT SERPL-CCNC: 22 IU/L (ref 0–32)
AST SERPL-CCNC: 23 IU/L (ref 0–40)
BILIRUB SERPL-MCNC: 0.3 MG/DL (ref 0–1.2)
BUN SERPL-MCNC: 13 MG/DL (ref 6–24)
BUN/CREAT SERPL: 18 (ref 9–23)
CALCIUM SERPL-MCNC: 9.3 MG/DL (ref 8.7–10.2)
CHLORIDE SERPL-SCNC: 107 MMOL/L (ref 96–106)
CHOLEST SERPL-MCNC: 169 MG/DL (ref 100–199)
CO2 SERPL-SCNC: 23 MMOL/L (ref 20–29)
CREAT SERPL-MCNC: 0.74 MG/DL (ref 0.57–1)
GLOBULIN SER CALC-MCNC: 1.9 G/DL (ref 1.5–4.5)
GLUCOSE SERPL-MCNC: 92 MG/DL (ref 65–99)
HDLC SERPL-MCNC: 74 MG/DL
INTERPRETATION, 910389: NORMAL
LDLC SERPL CALC-MCNC: 81 MG/DL (ref 0–99)
POTASSIUM SERPL-SCNC: 4.4 MMOL/L (ref 3.5–5.2)
PROT SERPL-MCNC: 6.3 G/DL (ref 6–8.5)
SODIUM SERPL-SCNC: 143 MMOL/L (ref 134–144)
TRIGL SERPL-MCNC: 68 MG/DL (ref 0–149)
VLDLC SERPL CALC-MCNC: 14 MG/DL (ref 5–40)

## 2019-09-05 LAB
25(OH)D3+25(OH)D2 SERPL-MCNC: 20.2 NG/ML (ref 30–100)
ANA SER QL: NEGATIVE
CCP IGA+IGG SERPL IA-ACNC: 3 UNITS (ref 0–19)
CRP SERPL-MCNC: <1 MG/L (ref 0–10)
ERYTHROCYTE [SEDIMENTATION RATE] IN BLOOD BY WESTERGREN METHOD: 2 MM/HR (ref 0–32)
RHEUMATOID FACT SERPL-ACNC: <10 IU/ML (ref 0–13.9)
TSH SERPL DL<=0.005 MIU/L-ACNC: 2.77 UIU/ML (ref 0.45–4.5)
VIT B12 SERPL-MCNC: 490 PG/ML (ref 232–1245)

## 2019-09-05 RX ORDER — ERGOCALCIFEROL 1.25 MG/1
50000 CAPSULE ORAL
Qty: 12 CAP | Refills: 0 | Status: SHIPPED | OUTPATIENT
Start: 2019-09-05 | End: 2019-11-25 | Stop reason: SDUPTHER

## 2019-09-16 ENCOUNTER — HOSPITAL ENCOUNTER (OUTPATIENT)
Dept: MAMMOGRAPHY | Age: 39
Discharge: HOME | End: 2019-09-16
Attending: INTERNAL MEDICINE
Payer: COMMERCIAL

## 2019-09-16 DIAGNOSIS — Z12.39 BREAST CANCER SCREENING: ICD-10-CM

## 2019-09-16 PROCEDURE — 77063 BREAST TOMOSYNTHESIS BI: CPT

## 2019-11-27 DIAGNOSIS — B00.9 HSV-1 INFECTION: ICD-10-CM

## 2019-11-27 RX ORDER — VALACYCLOVIR HYDROCHLORIDE 1 G/1
TABLET, FILM COATED ORAL
Qty: 12 TAB | Refills: 1 | Status: SHIPPED | OUTPATIENT
Start: 2019-11-27 | End: 2020-07-17 | Stop reason: ALTCHOICE

## 2019-12-09 RX ORDER — AMOXICILLIN AND CLAVULANATE POTASSIUM 875; 125 MG/1; MG/1
1 TABLET, FILM COATED ORAL EVERY 12 HOURS
Qty: 14 TAB | Refills: 0 | Status: SHIPPED | OUTPATIENT
Start: 2019-12-09 | End: 2019-12-16

## 2019-12-09 NOTE — TELEPHONE ENCOUNTER
Orders Placed This Encounter    amoxicillin-clavulanate (AUGMENTIN) 875-125 mg per tablet     Sig: Take 1 Tab by mouth every twelve (12) hours for 7 days. Dispense:  14 Tab     Refill:  0     The above orders were approved via [facility] per Dr. Megan He, III.

## 2020-04-19 ENCOUNTER — E-VISIT (OUTPATIENT)
Dept: INTERNAL MEDICINE CLINIC | Age: 40
End: 2020-04-19

## 2020-04-19 DIAGNOSIS — F41.1 GENERALIZED ANXIETY DISORDER: ICD-10-CM

## 2020-04-20 RX ORDER — BUSPIRONE HYDROCHLORIDE 10 MG/1
TABLET ORAL
Qty: 270 TAB | Refills: 1 | Status: SHIPPED | OUTPATIENT
Start: 2020-04-20 | End: 2020-04-21 | Stop reason: SDUPTHER

## 2020-04-21 DIAGNOSIS — F41.1 GENERALIZED ANXIETY DISORDER: ICD-10-CM

## 2020-04-21 RX ORDER — BUSPIRONE HYDROCHLORIDE 10 MG/1
TABLET ORAL
Qty: 270 TAB | Refills: 1 | Status: SHIPPED | OUTPATIENT
Start: 2020-04-21 | End: 2020-11-19

## 2020-05-12 ENCOUNTER — OFFICE VISIT (OUTPATIENT)
Dept: RHEUMATOLOGY | Age: 40
End: 2020-05-12

## 2020-05-12 VITALS
WEIGHT: 140 LBS | DIASTOLIC BLOOD PRESSURE: 85 MMHG | RESPIRATION RATE: 18 BRPM | SYSTOLIC BLOOD PRESSURE: 148 MMHG | HEART RATE: 75 BPM | HEIGHT: 69 IN | BODY MASS INDEX: 20.73 KG/M2 | TEMPERATURE: 98 F

## 2020-05-12 DIAGNOSIS — M77.8 LEFT WRIST TENDINITIS: Primary | ICD-10-CM

## 2020-05-12 DIAGNOSIS — M35.7 HYPERMOBILITY SYNDROME: ICD-10-CM

## 2020-05-12 DIAGNOSIS — M25.512 LEFT SHOULDER PAIN, UNSPECIFIED CHRONICITY: ICD-10-CM

## 2020-05-12 RX ORDER — CHOLECALCIFEROL (VITAMIN D3) 125 MCG
10 CAPSULE ORAL
COMMUNITY

## 2020-05-12 NOTE — PROGRESS NOTES
REASON FOR VISIT    This is the initial evaluation for Ms. Mandy Mauricio a 35 y.o.  female for question of an inflammatory arthritis. The patient is referred to the [facility] at the request of Dr. Mandy Mauricio. HISTORY OF PRESENT ILLNESS      I have reviewed and summarized old records from [facility] ([address] [place] [address])    In 2019, she developed pain in her left wrist for about a week with difficulty opening jars or leaning over the spin bike bars. These episodes would resolve and recur on their own. She also recalls waking up with left 2nd digit stiffness that lasting several days without pain. After one week, she had development of all her fingers sparing her thumb where she had stiffness lasting up to 30 minutes worse in the morning. She had fallen and fractured her left middle finger. In 9/03/2019, labs showed creatinine 0.74 mg/dL, eGFR 102, albumin 4.4 g/dL, ALK 48 U/L, ALT 22 U/L, AST 23 U/L, TSH 2.770, ESR 2 mm/hr, CRP <1 mg/L, vitamin D 20.2, negative REJI direct, rheumatoid factor, anti-CCP. Today, she reports that over the past 3 months, she has had left wrist stabbing pain with use (typing, picking up a pan). If she is not using her wrist, there is no discomfort. She denies swelling or stiffness in her wrist. At times it is worse than others and she has been taking ibuprofen which helps minimize her pain. She continues to have morning stiffness in her fingers up to 30 minutes. She notes aching pain in her fingers in the morning that resolves quickly. She continues to have stiffness and need to pop her left 3rd digit after the fracture. She cannot bend it. About one month ago, she tried rotating her camera from the right side to th left and then felt a popping severe acute snapping pain in her left shoulder that resolved, but noted that if she used her shoulder like pulling her pants up or walking her dog when it tugs, she will have pain.  Today, it feels better but at times, it may hurt with random activities or lifting objects. Therapy History includes:  Current DMARD therapy includes: none  Prior DMARD therapy includes: none  The following DMARDs have been ineffective: none  The following DMARDs were stopped because of side effects: none    REVIEW OF SYSTEMS    A 15 point review of systems was performed and summarized below. The questionnaire was reviewed with the patient and scanned into the patient's medical record.     General: denies recent weight gain, recent weight loss, fatigue, weakness, fever, drenching night sweats  Musculoskeletal: endorses joint pain, morning stiffness (lasting 30 minutes), denies joint swelling, muscle pain  Ears: denies ringing in ears, hearing loss, deafness  Eyes: denies pain, light sensitive, redness, blindness, double vision, blurred vision, excess tearing, dryness, foreign body sensation  Mouth: denies sore tongue, oral ulcers, loss of taste, dryness, increased dental caries  Nose: denies nosebleeds, nasal ulcers  Throat: denies food stuck when swallowing, difficulty with swallowing, hoarseness, pain in jaw while chewing  Neck: denies swollen glands, tender glands  Cardiopulmonary: denies pain in chest with deep breaths, pain in chest when lying down, murmurs, sudden changes in heart beat, wheezing, dry cough, productive cough, shortness of breath at rest, shortness of breath on exertion, coughing of blood  Gastrointestinal: denies nausea, heartburn, stomach pain relieved by food, chronic constipation, chronic diarrhea, blood in stools, black stools  Genitourinary: denies vaginal dryness, pain or burning on urination, blood in urine, cloudy urine, vaginal ulcers   Hematologic: denies anemia, bleeding tendency, blood clots, bleeding gums  Skin: denies easy bruising, hair loss, rash, rash worsened after sun exposure, hives/urticaria, skin thickening, skin tightness, nodules/bumps, color changes of hands or feet in the cold (Raynaud's)  Neurologic: denies numbness or tingling in hands, numbness or tingling in feet, muscle weakness  Psychiatric: denies depression, excessive worries, PTSD, Bipolar  Sleep: denies poor sleep (8 hours), snoring, apnea, daytime somnolence, difficulty falling asleep, difficulty staying asleep     PAST MEDICAL HISTORY    She has a past medical history of Abnormal Pap smear, Headache, Herpes simplex without mention of complication, Ill-defined condition, Infertility, and Unspecified breast disorder. FAMILY HISTORY    Her family history includes Asthma in her brother; Elevated Lipids in her maternal grandfather and paternal grandmother; Hypertension in her father and paternal grandmother; No Known Problems in her mother. SOCIAL HISTORY    She reports that she has never smoked. She has never used smokeless tobacco. She reports current alcohol use of about 2.0 standard drinks of alcohol per week. She reports that she does not use drugs. GYNECOLOGIC HISTORY     5, Para 4, Living 4, Miscarriage 1 @ 15 weeks    She denies severe pre-eclampsia, eclampsia or placental insufficiency    HEALTH MAINTENANCE    Immunizations  Immunization History   Administered Date(s) Administered    Influenza Vaccine 2014    Influenza Vaccine (Quad) PF 10/20/2015       Age Appropriate Cancer Screening    Mammogram: 2019    MEDICATIONS    Current Outpatient Medications   Medication Sig Dispense Refill    melatonin 5 mg tablet Take 10 mg by mouth nightly.  busPIRone (BUSPAR) 10 mg tablet TAKE 1&1/2 TABLET BY MOUTH TWICE DAILY 270 Tab 1    valACYclovir (VALTREX) 1 gram tablet TAKE 2 TABLETS BY MOUTH NOW &REPEAT IN 12HRS. *4 TABLETS TOTAL PER OUTBREAK* 12 Tab 1    eletriptan (RELPAX) 40 mg tablet may repeat in 2 hours if necessary. Max 80 mg/day.  Do not take with fiorcet 12 Tab 1    butalbital-acetaminophen-caffeine (FIORICET, ESGIC) -40 mg per tablet Take 1.5 tabs po only as needed daily for migraine Indications: migraine headache 30 Tab 0    fluticasone (FLONASE ALLERGY RELIEF) 50 mcg/actuation nasal spray 2 Sprays by Both Nostrils route as needed.  ibuprofen (MOTRIN) 800 mg tablet Take 800 mg by mouth every eight (8) hours as needed for Pain.  ergocalciferol (ERGOCALCIFEROL) 50,000 unit capsule TAKE ONE CAPSULE BY MOUTH ONE TIME PER WEEK 12 Cap 0    FINACEA 15 % topical gel   3       ALLERGIES    No Known Allergies    PHYSICAL EXAMINATION    Visit Vitals  /85   Pulse 75   Temp 98 °F (36.7 °C)   Resp 18   Ht 5' 9\" (1.753 m)   Wt 140 lb (63.5 kg)   BMI 20.67 kg/m²     Body mass index is 20.67 kg/m². General: Patient is alert, oriented x 3, not in acute distress    HEENT:   Conjunctiva are not injected and appear moist, oral mucous membranes are moist, there are no ulcers present, there is no alopecia, neck is supple, there is no lymphadenopathy. Salivary glands are normal    Cardiovascular:  Heart is regular rate and rhythm, no murmurs. Chest:  Lungs are clear to auscultation bilaterally. Extremities:  Free of clubbing, cyanosis, edema, extremities well perfused. Neurological exam:  Muscle strength is full in upper and lower extremities. Skin exam:  There are no rashes, no tophi, no psoriasis, no active Raynaud's, no livedo reticularis, no periungual erythema. Musculoskeletal exam:  A comprehensive musculoskeletal exam was performed for all joints of each upper and lower extremity and assessed for swelling, tenderness and range of motion.  Pertinent results are documented as below:    NROM active ROM of shoulders  Pain along the teres minor with shoulder internal rotation/adduction  Negative Empty can sign  No tenderness along the common extensor or common flexor tendons  Pain along the ulnar tendons with wrist resistance maneuvers (flexion and external rotation)  Bilateral patellar laxity without tenderness  No synovitis    Beighton score: Right                Left   Passively dorsiflex the fifth metacarpophalangeal joint by at least 90 degrees                     1                    1   Oppose the thumb to the volar aspect of the ipsilateral forearm                                           1                    1  Hyperextend the elbow by at least 10 degrees                                                                      0                    0  Hyperextend the knee by at least 10 degrees                                                                        0                    0   Place the hands flat on the floor without bending the knees                                                             N/A                                                                                                                                                                                                                                                                             Score >=4    DATA REVIEW    Prior medical records were reviewed and are summarized as below:    Laboratory data: summarized in the HPI    Imaging: summarized in the HPI. ASSESSMENT AND PLAN    1) Medial Wrist Tendinitis. This is reproducible with resistance maneuvers. I recommended regular NSAIDs (Aleve or Advil) use for at least 2 weeks and hand therapy exercises. I did not appreciate synovitis. 2) Left Shoulder Rotator Cuff Tendinitis. I suspect she has irritated her teres minor tendon when she was reaching over. I was able to reproduce this on exam with internal rotation adduction with pushing off her back. This has slowly improved over time. 3) Hypermobility Syndrome. Her Beighton Score was at least 4. This is an heritable disorder that is associated with chronic idiopathic pain due to laxity of ligaments and tendons (Jasmeet METZGER et al. Phyiostherapy. 2013 Oct 5). The management to better function and reduce pain is through a physical conditioning program. Physical therapy with individualized muscle strengthening is key, in particular core strengthening. Referral to a rehabilitation medicine specialist who has expertise in treating patients with hypermobility-related conditions may be appropriate if she does not improve. I recommend core strengthening exercises, like [place] and/or Yoga. I do not recommend Cross Fit or high intensity training exercises. 4) Left Hand stiffness. I did not appreciate synovitis on exam. This may be referred from her wrist tendinitis. She has injured her left 3rd PIP, but examination was equivocal to the contra-lateral side./    The patient voiced understanding of the aforementioned assessment and plan. Summary of plan was provided in the After Visit Summary patient instructions. I also provided education about MyChart setup and utility. TODAY'S ORDERS    None    No future appointments.     Payam Queen MD, [address] [facility]    Adult Rheumatology   Rheumatology Ultrasound Certified  [address] [facility] [address]  [address] [address]   Phone [phone]  Fax [phone]

## 2020-05-12 NOTE — PATIENT INSTRUCTIONS
Take Aleve (2 tabs twice daily with food) or Advil (2 tabs 3 times a day with good)     Wrist Tendinitis: Exercises  Introduction  Here are some examples of exercises for you to try. The exercises may be suggested for a condition or for rehabilitation. Start each exercise slowly. Ease off the exercises if you start to have pain. You will be told when to start these exercises and which ones will work best for you. How to do the exercises  Wrist flexion and extension   1. Place your forearm on a table, with your hand and affected wrist extended beyond the table, palm down. 2. Bend your wrist to move your hand upward and allow your hand to close into a fist, then lower your hand and allow your fingers to relax. Hold each position for about 6 seconds. 3. Repeat 8 to 12 times. Hand flips   1. While seated, place your forearm and affected wrist on your thigh, palm down. 2. Flip your hand over so the back of your hand rests on your thigh and your palm is up. Alternate between palm up and palm down while keeping your forearm on your thigh. 3. Repeat 8 to 12 times. Wrist radial and ulnar deviation   1. Hold your affected hand out in front of you, palm down. 2. Slowly bend your wrist as far as you can from side to side. Hold each position for about 6 seconds. 3. Repeat 8 to 12 times. Wrist extensor stretch   1. Extend the arm with the affected wrist in front of you and point your fingers toward the floor. 2. With your other hand, gently bend your wrist farther until you feel a mild to moderate stretch in your forearm. 3. Hold the stretch for at least 15 to 30 seconds. 4. Repeat 2 to 4 times. 5. When you can do this stretch with ease and no pain, repeat steps 1 through 4. But this time extend your affected arm in front of you and make a fist with your palm facing down. Then bend your wrist, pointing your fist toward the floor. Wrist flexor stretch   1.  Extend the arm with the affected wrist in front of you with your palm facing away from your body. 2. Bend back your wrist, pointing your hand up toward the ceiling. 3. With your other hand, gently bend your wrist farther until you feel a mild to moderate stretch in your forearm. 4. Hold the stretch for at least 15 to 30 seconds. 5. Repeat 2 to 4 times. 6. Repeat steps 1 through 5, but this time extend your affected arm in front of you with your palm facing up. Then bend back your wrist, pointing your hand toward the floor. Follow-up care is a key part of your treatment and safety. Be sure to make and go to all appointments, and call your doctor if you are having problems. It's also a good idea to know your test results and keep a list of the medicines you take. Where can you learn more? Go to http://hari-debbi.info/  Enter V785 in the search box to learn more about \"Wrist Tendinitis: Exercises. \"  Current as of: June 26, 2019Content Version: 12.4  © 9577-3732 [employer]. Care instructions adapted under license by [employer] (which disclaims liability or warranty for this information). If you have questions about a medical condition or this instruction, always ask your healthcare professional. [address] any warranty or liability for your use of this information.

## 2020-05-12 NOTE — LETTER
5/12/20 Patient: Kathryn Elizabeth YOB: 1980 Date of Visit: 5/12/2020 Janis Bhat MD 
[address] [facility] [address] [facility] [phone] VIA In Basket Dear Janis Bhat MD, Thank you for referring Ms. Soha Hutchinson to [facility] for evaluation. My notes for this consultation are attached. If you have questions, please do not hesitate to call me. I look forward to following your patient along with you.  
 
 
Sincerely, 
 
Angeles Herbert MD

## 2020-07-17 ENCOUNTER — E-VISIT (OUTPATIENT)
Dept: INTERNAL MEDICINE CLINIC | Age: 40
End: 2020-07-17

## 2020-07-17 DIAGNOSIS — B00.9 HSV-1 INFECTION: ICD-10-CM

## 2020-07-17 RX ORDER — VALACYCLOVIR HYDROCHLORIDE 1 G/1
TABLET, FILM COATED ORAL
Qty: 12 TAB | Refills: 3 | Status: SHIPPED | OUTPATIENT
Start: 2020-07-17 | End: 2021-02-19 | Stop reason: SDUPTHER

## 2020-08-21 DIAGNOSIS — G43.819 OTHER MIGRAINE WITHOUT STATUS MIGRAINOSUS, INTRACTABLE: ICD-10-CM

## 2020-08-22 RX ORDER — BUTALBITAL, ACETAMINOPHEN AND CAFFEINE 50; 325; 40 MG/1; MG/1; MG/1
TABLET ORAL
Qty: 30 TAB | Refills: 0 | Status: SHIPPED | OUTPATIENT
Start: 2020-08-22 | End: 2021-02-19 | Stop reason: SDUPTHER

## 2020-09-30 ENCOUNTER — OFFICE VISIT (OUTPATIENT)
Dept: RHEUMATOLOGY | Age: 40
End: 2020-09-30
Payer: COMMERCIAL

## 2020-09-30 VITALS
SYSTOLIC BLOOD PRESSURE: 132 MMHG | RESPIRATION RATE: 18 BRPM | DIASTOLIC BLOOD PRESSURE: 94 MMHG | TEMPERATURE: 98.5 F | BODY MASS INDEX: 20.44 KG/M2 | HEIGHT: 69 IN | WEIGHT: 138 LBS | HEART RATE: 86 BPM

## 2020-09-30 DIAGNOSIS — M35.9 UNDIFFERENTIATED CONNECTIVE TISSUE DISEASE (HCC): Primary | ICD-10-CM

## 2020-09-30 PROCEDURE — 99214 OFFICE O/P EST MOD 30 MIN: CPT | Performed by: INTERNAL MEDICINE

## 2020-09-30 RX ORDER — HYDROXYCHLOROQUINE SULFATE 200 MG/1
400 TABLET, FILM COATED ORAL DAILY
Qty: 180 TAB | Refills: 0 | Status: SHIPPED | OUTPATIENT
Start: 2020-09-30 | End: 2020-12-28 | Stop reason: SDUPTHER

## 2020-09-30 NOTE — PATIENT INSTRUCTIONS
Hydroxychloroquine (Plaquenil) Hydroxychloroquine is a safe medication, which does not lead to a significant risk of infection. A rare complication of long-term hydroxychloroquine use is retinopathy. There is no risk of this occurring in the first 5 years of therapy; after the first 5 years, the risk is 1/5000. I therefore recommend that all patients taking hydroxychloroquine see an ophthalmologist on a yearly basis.

## 2020-09-30 NOTE — LETTER
9/30/20 Patient: Camila Camilo YOB: 1980 Date of Visit: 9/30/2020 Ravi Castellanos MD 
[address] [facility] [address] [facility] [phone] VIA In Basket Dear Ravi Castellanos MD, Thank you for referring Ms. Maira Zhang to [facility] for evaluation. My notes for this consultation are attached. If you have questions, please do not hesitate to call me. I look forward to following your patient along with you.  
 
 
Sincerely, 
 
Mariela Mustafa MD

## 2020-09-30 NOTE — PROGRESS NOTES
REASON FOR VISIT    This is a follow-up visit for Ms. Shayy Singleton for     ICD-10-CM   1. Undifferentiated connective tissue disease ([facility]  M35.9     Inflammatory arthritis phenotype includes:  Anti-CCP positive: no  Rheumatoid factor positive: no  Erosive disease: N/A  Extra-articular manifestations include: benign hypermobility syndrome    Immunosuppression Screening:  Quantiferon TB: Not performed  PPD:  Not performed  Hepatitis B: Not performed  Hepatitis C: Not performed    Therapy History includes:  Current DMARD therapy include: none  Prior DMARD therapy include: none  Discontinued DMARDs because of inefficacy: None  Discontinued DMARDs because of side effects: None    Immunization History   Administered Date(s) Administered    Influenza Vaccine 2014    Influenza Vaccine [employer]) PF (>6 Mo Flulaval, Fluarix, and >3 Yrs Afluria, Fluzone 88244) 10/20/2015       Patient Active Problem List   Diagnosis Code    Twins [id number]    Vaginal bleeding in pregnancy O46.90    Placenta previa with hemorrhage in third trimester O44.13    Threatened  labor O47.00    Unspecified breast disorder N64.9    Migraine G43.909       HISTORY OF PRESENT ILLNESS    Ms. Shayy Singleton returns for a follow-up. Today, she complains of pain in her wrists after use, such as gripping things that may radiating pain that is shooting with use but not with use. This resolves on its own and then recurs without exacerbating factors. She has been sleeping with a brace due to history of sleeping in wrist flexion which did not help despite wearing for months. She also has left hand stiffness that then evolved into her right hand lasting up to 10 minutes and resolves until the next morning. She has fractured her left 3rd PIP a year ago and when it healed, she had pain in her distal pad that resolved. She now has pain in her right 3rd DIP that is similar to her left which feels like a bruising.  On 2020, she sent me a Deminos message complaining of a throbbing aching from her elbow to her fingers without injury that resolved by the next morning. She notes swelling in her right 2nd, and 3rd, and 4th digits that improved. She has also developed left wrist pain when she uses it such as lifting her weight off the chair, but also has been having pain with it when she opens a door. She has been taking ibuprofen but her symptoms are fleeting, so she is uncertain if it is helping. She denies fever, weight loss, blurred vision, vision loss, oral ulcers, ankle swelling, dry cough, dyspnea, nausea, vomiting, dysphagia, abdominal pain, black or bloody stool, fall since last visit, rash, easy bruising and increased thirst.    Last toxicity monitoring by blood work was done on 9/03/2019 and did not reveal any significant adverse effects. Most recent inflammatory markers from 9/03/2019 revealed a ESR 2 mm/hr and CRP <1 mg/L. REVIEW OF SYSTEMS    A comprehensive review of systems was performed and pertinent results are documented in the HPI, review of systems is otherwise non-contributory. PAST MEDICAL HISTORY    She has a past medical history of Abnormal Pap smear, Headache, Herpes simplex without mention of complication, Ill-defined condition, Infertility, and Unspecified breast disorder. FAMILY HISTORY    Her family history includes Asthma in her brother; Elevated Lipids in her maternal grandfather and paternal grandmother; Hypertension in her father and paternal grandmother; No Known Problems in her mother. SOCIAL HISTORY    She reports that she has never smoked. She has never used smokeless tobacco. She reports current alcohol use of about 2.0 standard drinks of alcohol per week. She reports that she does not use drugs. MEDICATIONS    Current Outpatient Medications   Medication Sig Dispense Refill    hydrOXYchloroQUINE (PLAQUENIL) 200 mg tablet Take 2 Tabs by mouth daily.  180 Tab 0    butalbital-acetaminophen-caffeine (FIORICET, ESGIC) -40 mg per tablet Take 1.5 tabs po only as needed daily for migraine  Indications: a migraine headache 30 Tab 0    valACYclovir (VALTREX) 1 gram tablet TAKE 2 TABLETS BY MOUTH NOW &REPEAT IN 12HRS. *4 TABLETS TOTAL PER OUTBREAK*, 12 Tab 3    melatonin 5 mg tablet Take 10 mg by mouth nightly.  busPIRone (BUSPAR) 10 mg tablet TAKE 1&1/2 TABLET BY MOUTH TWICE DAILY 270 Tab 1    eletriptan (RELPAX) 40 mg tablet may repeat in 2 hours if necessary. Max 80 mg/day. Do not take with fiorcet 12 Tab 1    fluticasone (FLONASE ALLERGY RELIEF) 50 mcg/actuation nasal spray 2 Sprays by Both Nostrils route as needed.  ibuprofen (MOTRIN) 800 mg tablet Take 800 mg by mouth every eight (8) hours as needed for Pain.  FINACEA 15 % topical gel   3    ergocalciferol (ERGOCALCIFEROL) 50,000 unit capsule TAKE ONE CAPSULE BY MOUTH ONE TIME PER WEEK 12 Cap 0        ALLERGIES    No Known Allergies    PHYSICAL EXAMINATION    Visit Vitals  BP (!) 132/94   Pulse 86   Temp 98.5 °F (36.9 °C)   Resp 18   Ht 5' 9\" (1.753 m)   Wt 138 lb (62.6 kg)   BMI 20.38 kg/m²     Body mass index is 20.38 kg/m². General: Patient is alert, oriented x 3, not in acute distress    HEENT:   Sclerae are not injected and appear moist.  There is no alopecia. Neck is supple     HEENT:   Conjunctiva are not injected and appear moist, there is no alopecia. Cardiovascular:  Heart is regular rate and rhythm, no murmurs. Chest:  Lungs are clear to auscultation bilaterally. Extremities:  Free of clubbing, cyanosis, edema, extremities well perfused. Neurological exam:  Muscle strength is full in upper and lower extremities. Skin exam:  There are no rashes, no tophi, no psoriasis, no active Raynaud's, no livedo reticularis, no periungual erythema. Musculoskeletal exam:  A comprehensive musculoskeletal exam was performed for all joints of each upper and lower extremity and assessed for swelling, tenderness and range of motion. Positive results are documented as below:    NROM active ROM of shoulders  Pain along the teres minor with shoulder internal rotation/adduction  Negative Empty can sign  No tenderness along the common extensor or common flexor tendons  Pain along the ulnar tendons with wrist resistance maneuvers (flexion and external rotation)  Bilateral patellar laxity without tenderness    Beighton Score Score >=4    Joint Count 9/30/2020 5/12/2020   MHAQ - 0   Right 3rd PIP - Tender 1 -   Right 3rd PIP - Swollen 1 -   Tender Joint Count (Total) 1 -   Swollen Joint Count (Total) 1 -     DATA REVIEW    Laboratory     Recent laboratory results were reviewed, summarized, and discussed with the patient. Imaging    Musculoskeletal Ultrasound    None    Radiographs    None    CT Imaging    None    MR Imaging    None    DXA     None    ASSESSMENT AND PLAN    This is a follow-up visit for Ms. Kiara Cerna. 1) Undifferentiated Inflammatory Arthritis versus Hypermobility Syndrome. I did not appreciate synovitis on her initial exam but she has a tender and swollen joint in her right 3rd PIP. She has vague inflammatory symptoms    I discussed a 3 month trial of hydroxychloroquine 400 mg daily. Hydroxychloroquine is a safe medication, which does not lead to a significant risk of infection. A rare complication of long-term hydroxychloroquine use is retinopathy. There is no risk of this occurring in the first 5 years of therapy; after the first 5 years, the risk is 1/5000. I therefore recommend that all patients taking hydroxychloroquine see an ophthalmologist on a yearly basis. 2) Medial Wrist Tendinitis. This was alexi reproducible with resistance maneuvers. I did not appreciate synovitis.    3) Left Shoulder Rotator Cuff Tendinitis. I suspect she has irritated her teres minor tendon when she was reaching over. I was able to reproduce this on exam with internal rotation adduction with pushing off her back.  This has slowly improved over time.     4) Hypermobility Syndrome. Her Beighton Score was at least 4. The patient voiced understanding of the aforementioned assessment and plan. Summary of plan was provided in the After Visit Summary patient instructions. I also provided education about MyChart setup and utility.     TODAY'S ORDERS    Orders Placed This Encounter    hydrOXYchloroQUINE (PLAQUENIL) 200 mg tablet     Future Appointments   Date Time Provider Cooper Sia   10/1/2020  9:00 AM SPT MAMMO 1 SPTMAMMO SPT   10/8/2020 11:00 AM Katya Mckeon MD [facility] BS AMB   12/31/2020  4:00 PM Apoorva Reyna MD [facility] BS AMB     Mamta Martin MD, [address] [facility]    Adult Rheumatology   Rheumatology Ultrasound Certified  [facility]  [facility] Part of [facility], [address]   Phone [phone]  Fax [phone]

## 2020-10-01 ENCOUNTER — HOSPITAL ENCOUNTER (OUTPATIENT)
Dept: MAMMOGRAPHY | Age: 40
Discharge: HOME | End: 2020-10-01
Attending: INTERNAL MEDICINE
Payer: COMMERCIAL

## 2020-10-01 DIAGNOSIS — Z12.31 BREAST CANCER SCREENING BY MAMMOGRAM: ICD-10-CM

## 2020-10-01 PROCEDURE — 77063 BREAST TOMOSYNTHESIS BI: CPT

## 2020-10-08 ENCOUNTER — OFFICE VISIT (OUTPATIENT)
Dept: INTERNAL MEDICINE CLINIC | Age: 40
End: 2020-10-08
Payer: COMMERCIAL

## 2020-10-08 VITALS
HEART RATE: 71 BPM | BODY MASS INDEX: 20.73 KG/M2 | OXYGEN SATURATION: 98 % | RESPIRATION RATE: 14 BRPM | SYSTOLIC BLOOD PRESSURE: 123 MMHG | WEIGHT: 140 LBS | DIASTOLIC BLOOD PRESSURE: 84 MMHG | TEMPERATURE: 98 F | HEIGHT: 69 IN

## 2020-10-08 DIAGNOSIS — Z00.00 WELL ADULT EXAM: Primary | ICD-10-CM

## 2020-10-08 DIAGNOSIS — Z78.9 VEGETARIAN DIET: ICD-10-CM

## 2020-10-08 LAB
ALBUMIN SERPL-MCNC: 4 G/DL (ref 3.5–5)
ALBUMIN/GLOB SERPL: 1.5 {RATIO} (ref 1.1–2.2)
ALP SERPL-CCNC: 60 U/L (ref 45–117)
ALT SERPL-CCNC: 32 U/L (ref 12–78)
ANION GAP SERPL CALC-SCNC: 6 MMOL/L (ref 5–15)
AST SERPL-CCNC: 24 U/L (ref 15–37)
BILIRUB SERPL-MCNC: 0.3 MG/DL (ref 0.2–1)
BUN SERPL-MCNC: 13 MG/DL (ref 6–20)
BUN/CREAT SERPL: 19 (ref 12–20)
CALCIUM SERPL-MCNC: 9 MG/DL (ref 8.5–10.1)
CHLORIDE SERPL-SCNC: 110 MMOL/L (ref 97–108)
CO2 SERPL-SCNC: 26 MMOL/L (ref 21–32)
CREAT SERPL-MCNC: 0.69 MG/DL (ref 0.55–1.02)
ERYTHROCYTE [DISTWIDTH] IN BLOOD BY AUTOMATED COUNT: 13.7 % (ref 11.5–14.5)
FERRITIN SERPL-MCNC: 10 NG/ML (ref 26–388)
GLOBULIN SER CALC-MCNC: 2.7 G/DL (ref 2–4)
GLUCOSE SERPL-MCNC: 91 MG/DL (ref 65–100)
HCT VFR BLD AUTO: 44.3 % (ref 35–47)
HGB BLD-MCNC: 14.2 G/DL (ref 11.5–16)
IRON SATN MFR SERPL: 18 % (ref 20–50)
IRON SERPL-MCNC: 64 UG/DL (ref 35–150)
MCH RBC QN AUTO: 29.2 PG (ref 26–34)
MCHC RBC AUTO-ENTMCNC: 32.1 G/DL (ref 30–36.5)
MCV RBC AUTO: 91 FL (ref 80–99)
NRBC # BLD: 0 K/UL (ref 0–0.01)
NRBC BLD-RTO: 0 PER 100 WBC
PLATELET # BLD AUTO: 188 K/UL (ref 150–400)
PMV BLD AUTO: 12.1 FL (ref 8.9–12.9)
POTASSIUM SERPL-SCNC: 4.4 MMOL/L (ref 3.5–5.1)
PROT SERPL-MCNC: 6.7 G/DL (ref 6.4–8.2)
RBC # BLD AUTO: 4.87 M/UL (ref 3.8–5.2)
SODIUM SERPL-SCNC: 142 MMOL/L (ref 136–145)
TIBC SERPL-MCNC: 357 UG/DL (ref 250–450)
VIT B12 SERPL-MCNC: 438 PG/ML (ref 193–986)
WBC # BLD AUTO: 5.1 K/UL (ref 3.6–11)

## 2020-10-08 PROCEDURE — 99396 PREV VISIT EST AGE 40-64: CPT | Performed by: INTERNAL MEDICINE

## 2020-10-08 NOTE — PROGRESS NOTES
Chief Complaint   Patient presents with    Complete Physical     TB screening.  Medication Evaluation     Patient presents for her physical visit. She and her  are interested in becoming pot foster parents. Since last visit diagnosed with wrist issues. She is being followed by Dr. Ivonne Rincon. She has started hydroxychloroquine and has been on for a week. Migraines  Better controlled  Since Covid not more than once for month. Responds well to relpax and fiorocet. Anxiety mild insomnia  Patient reports the BuSpar is working well for her. She is sleeping better. She is taking 15 mg twice a day. Patient finds time to exercise by cycling. Cold sores  Patient has outbreaks once every 3 months. Rosacea  Would like to follow up with derm  Patient reports that she is on spinal lactone and finasteride. She denies lightheaded or dizziness.       Past Medical History:   Diagnosis Date    Abnormal Pap smear     Colposcopy in     Headache     migraines    Herpes simplex without mention of complication     Cold sores (Oct 2013)    Ill-defined condition     rosacia    Infertility     IVF with first 3 pregnancies, not this pregnancy    Unspecified breast disorder     biospy right breast,galactocele     Past Surgical History:   Procedure Laterality Date     DELIVERY ONLY      Twins    EXPLORATORY OF ABDOMEN      For infertility    HX BREAST AUGMENTATION Bilateral 2015    BILATERAL BREAST AUGMENTATION W RIGHT BREAST PERIAREOLAR MASTOPEXY SILICONE  performed by [facility]ancho [facility]ou [facility] at [facility] MA[facility] OR    HX BREAST AUGMENTATION Bilateral 2015    [address]    HX BREAST REDUCTION Right 2015    BREAST MASTOPEXY performed [facility] at [facility] [address] HX  SECTION      HX  SECTION      HX LEEP PROCEDURE      HX OTHER SURGICAL      Laproscopy for Infertility    HX OTHER SURGICAL      LEEP    HX OTHER SURGICAL   D&C    IMPLANT BREAST SILICONE/EQ Bilateral     2015     Social History     Socioeconomic History    Marital status:      Spouse name: Not on file    Number of children: Not on file    Years of education: Not on file    Highest education level: Not on file   Tobacco Use    Smoking status: Never Smoker    Smokeless tobacco: Never Used   Substance and Sexual Activity    Alcohol use: Yes     Alcohol/week: 2.0 standard drinks     Types: 1 Glasses of wine, 1 Cans of beer per week     Comment: rarely    Drug use: No    Sexual activity: Yes     Partners: Male     Birth control/protection: Surgical     Comment: vasectomy   Social History Narrative     many years ago    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 180 Tab 0    butalbital-acetaminophen-caffeine (FIORICET, ESGIC) -40 mg per tablet Take 1.5 tabs po only as needed daily for migraine  Indications: a migraine headache 30 Tab 0    valACYclovir (VALTREX) 1 gram tablet TAKE 2 TABLETS BY MOUTH NOW &REPEAT IN 12HRS. *4 TABLETS TOTAL PER OUTBREAK*, 12 Tab 3    melatonin 5 mg tablet Take 10 mg by mouth nightly.  busPIRone (BUSPAR) 10 mg tablet TAKE 1&1/2 TABLET BY MOUTH TWICE DAILY 270 Tab 1    eletriptan (RELPAX) 40 mg tablet may repeat in 2 hours if necessary. Max 80 mg/day. Do not take with fiorcet 12 Tab 1    fluticasone (FLONASE ALLERGY RELIEF) 50 mcg/actuation nasal spray 2 Sprays by Both Nostrils route as needed.       ibuprofen (MOTRIN) 800 mg tablet Take 800 mg by mouth every eight (8) hours as needed for Pain.  FINACEA 15 % topical gel   3    ergocalciferol (ERGOCALCIFEROL) 50,000 unit capsule TAKE ONE CAPSULE BY MOUTH ONE TIME PER WEEK (Patient taking differently: NOT TAKING.) 12 Cap 0     No Known Allergies    Review of Systems - General ROS: negative for - chills, fatigue, fever or hot flashes  Cardiovascular ROS: no chest pain or dyspnea on exertion  Respiratory ROS: no cough, shortness of breath, or wheezing    Visit Vitals  /84 (BP 1 Location: Left arm, BP Patient Position: Sitting)   Pulse 71   Temp 98 °F (36.7 °C) (Oral)   Resp 14   Ht 5' 9\" (1.753 m)   Wt 140 lb (63.5 kg)   LMP 10/07/2020   SpO2 98%   BMI 20.67 kg/m²     General Appearance:  Well developed, well nourished,alert and oriented x 3, and individual in no acute distress. Ears/Nose/Mouth/Throat:   Hearing grossly normal.         Neck: Supple, no lad, no bruits   Chest:   Lungs clear to auscultation bilaterally. Cardiovascular:  Regular rate and rhythm, S1, S2 normal, no murmur. Abdomen:   Soft, non-tender, bowel sounds are active. Extremities: No edema bilaterally.     Skin: Warm and dry, no suspicious lesions                 Prevention    Cardiovascular profile  Family hx  Exercising:  3 times /week    boflex bike but using [employer] gemma  Walking   Feels better after working out    Blood pressure:  Health healthy diet:  Diabetes:  Cholesterol:  Renal function:  Recently vegan      Cancer risk profile  Mammogram no sx 10/2020  , does not desire further children, may   Lung no sx with exercise  Colonoscopy no sx  Skin nonhealing in 2 weeks derm no wwill find antoehr derm  Gyn abnormal bleeding/discharge/abd pain/pressure- dr Brittni Lopez      Thyroid sx      Osteopenia prevention  Calcium 1000mg/day yes  Vitamin D 800iu/day yes    Mental health scale: as above  Depression  Anxiety  Sleep # of hours:  Energy Level: Immunizations  TDAP 2012  Pneumonia vaccine  Flu vaccine  Shingles vaccine  HPV      Diagnoses and all orders for this visit:    1. Well adult exam   She is physically and mentally in very good health. She would be an excellent candidate for a .  -     METABOLIC PANEL, COMPREHENSIVE; Future  -     CBC W/O DIFF; Future  -     VITAMIN B12; Future  -     FERRITIN; Future  -     IRON PROFILE; Future    2. Vegetarian diet  She is not on B12 or iron supplementation  Will assess and replete as needed  -     VITAMIN B12; Future  -     FERRITIN; Future  -     IRON PROFILE; Future    Follow-up in 1 year or earlier if needed.   She will follow-up with her rheumatologist and gynecologist.

## 2020-11-18 DIAGNOSIS — F41.1 GENERALIZED ANXIETY DISORDER: ICD-10-CM

## 2020-11-19 RX ORDER — BUSPIRONE HYDROCHLORIDE 10 MG/1
TABLET ORAL
Qty: 270 TAB | Refills: 1 | Status: SHIPPED | OUTPATIENT
Start: 2020-11-19 | End: 2021-07-27

## 2020-12-23 ENCOUNTER — TELEPHONE (OUTPATIENT)
Dept: RHEUMATOLOGY | Age: 40
End: 2020-12-23

## 2020-12-29 ENCOUNTER — VIRTUAL VISIT (OUTPATIENT)
Dept: RHEUMATOLOGY | Age: 40
End: 2020-12-29
Payer: COMMERCIAL

## 2020-12-29 DIAGNOSIS — Z79.899 LONG-TERM USE OF HYDROXYCHLOROQUINE: ICD-10-CM

## 2020-12-29 DIAGNOSIS — M35.9 UNDIFFERENTIATED CONNECTIVE TISSUE DISEASE (HCC): Primary | ICD-10-CM

## 2020-12-29 DIAGNOSIS — M35.7 HYPERMOBILITY SYNDROME: ICD-10-CM

## 2020-12-29 PROCEDURE — 99214 OFFICE O/P EST MOD 30 MIN: CPT | Performed by: INTERNAL MEDICINE

## 2020-12-29 RX ORDER — HYDROXYCHLOROQUINE SULFATE 200 MG/1
400 TABLET, FILM COATED ORAL DAILY
Qty: 180 TAB | Refills: 0 | Status: SHIPPED | OUTPATIENT
Start: 2020-12-29 | End: 2021-04-04 | Stop reason: SDUPTHER

## 2020-12-29 NOTE — PROGRESS NOTES
REASON FOR VISIT    This is a follow-up visit for Ms. Héctor Real for     ICD-10-CM   1. Undifferentiated connective tissue disease ([facility]  M35.9     The patient has consented for synchronous (real-time) Telemedicine (audio-video technology) on 12/29/2020 for their care to be delivered over telemedicine in place of their regularly scheduled office visit pursuant to the emergency declaration under the [address] [place] [address] [facility] authority and the [employer] Act, this Virtual  Visit was conducted, with patient's consent, to reduce the patient's risk of exposure to COVID-19 and provide continuity of care for an established patient. Services were provided through a video synchronous discussion virtually to substitute for in-person clinic visit. Inflammatory arthritis phenotype includes:  Anti-CCP positive: no  Rheumatoid factor positive: no  Erosive disease: N/A  Extra-articular manifestations include: benign hypermobility syndrome    Immunosuppression Screening:  Quantiferon TB: Not performed  PPD:  Not performed  Hepatitis B: Not performed  Hepatitis C: Not performed    Therapy History includes:  Current DMARD therapy include: hydroxychloroquine 400 mg daily (9/30/2020 to present)  Prior DMARD therapy include: none  Discontinued DMARDs because of inefficacy: None  Discontinued DMARDs because of side effects: None    HISTORY OF PRESENT ILLNESS    Ms. Héctor Real returns for a follow-up. On her last visit, I started her on hydroxychloroquine 400 mg daily as a trial for her inflammatory joint symptoms. Today, she feels \"so much better\". She no longer has wrist pain. She may have intermittent pain that is random. She no longer has issues gripping. She is able to play tennis without limitation, but may have pain when she first starts that resolves.  She is not sure if hydroxychloroquine is helping or if she had an unexplained issue at that times so wants to know if she can get off HCQ. She still has morning stiffness in her fingers lasting less than 20 minutes. HCQ did not help. She does not have elbow pain. She denies fever, weight loss, blurred vision, vision loss, oral ulcers, ankle swelling, dry cough, dyspnea, nausea, vomiting, dysphagia, abdominal pain, black or bloody stool, fall since last visit, rash, easy bruising and increased thirst.    Last toxicity monitoring by blood work was done on 9/03/2019 and did not reveal any significant adverse effects. Most recent inflammatory markers from 9/03/2019 revealed a ESR 2 mm/hr and CRP <1 mg/L. REVIEW OF SYSTEMS    A comprehensive review of systems was performed and pertinent results are documented in the HPI, review of systems is otherwise non-contributory. PAST MEDICAL HISTORY    She has a past medical history of Abnormal Pap smear, Headache, Herpes simplex without mention of complication, Ill-defined condition, Infertility, and Unspecified breast disorder. FAMILY HISTORY    Her family history includes Asthma in her brother; Elevated Lipids in her maternal grandfather and paternal grandmother; Hypertension in her father and paternal grandmother; No Known Problems in her mother. SOCIAL HISTORY    She reports that she has never smoked. She has never used smokeless tobacco. She reports current alcohol use of about 2.0 standard drinks of alcohol per week. She reports that she does not use drugs. MEDICATIONS    Current Outpatient Medications   Medication Sig    hydrOXYchloroQUINE (PLAQUENIL) 200 mg tablet Take 2 Tabs by mouth daily.  busPIRone (BUSPAR) 10 mg tablet TAKE ONE AND ONE-HALF TABLETS BY MOUTH TWICE A DAY    butalbital-acetaminophen-caffeine (FIORICET, ESGIC) -40 mg per tablet Take 1.5 tabs po only as needed daily for migraine  Indications: a migraine headache    valACYclovir (VALTREX) 1 gram tablet TAKE 2 TABLETS BY MOUTH NOW &REPEAT IN 12HRS.  *4 TABLETS TOTAL PER OUTBREAK*,    melatonin 5 mg tablet Take 10 mg by mouth nightly.  eletriptan (RELPAX) 40 mg tablet may repeat in 2 hours if necessary. Max 80 mg/day. Do not take with fiorcet    fluticasone (FLONASE ALLERGY RELIEF) 50 mcg/actuation nasal spray 2 Sprays by Both Nostrils route as needed.  ibuprofen (MOTRIN) 800 mg tablet Take 800 mg by mouth every eight (8) hours as needed for Pain.  FINACEA 15 % topical gel      No current facility-administered medications for this visit. ALLERGIES    No Known Allergies    PHYSICAL EXAMINATION    There were no vitals taken for this visit. There is no height or weight on file to calculate BMI. General: Patient is alert, oriented x 3, not in acute distress    HEENT:   Sclerae are not injected and appear moist.  There is no alopecia. Chest:  Breathing comfortably at room air    Musculoskeletal exam:  A comprehensive musculoskeletal exam was NOT performed for all joints of each upper and lower extremity and assessed for swelling, tenderness and range of motion. Positive results are documented as below:    Previous Exam    NROM active ROM of shoulders  Pain along the teres minor with shoulder internal rotation/adduction  Negative Empty can sign  No tenderness along the common extensor or common flexor tendons  Pain along the ulnar tendons with wrist resistance maneuvers (flexion and external rotation)  Bilateral patellar laxity without tenderness    Beighton Score Score >=4    Joint Count 9/30/2020 5/12/2020   MHAQ - 0   Right 3rd PIP - Tender 1 -   Right 3rd PIP - Swollen 1 -   Tender Joint Count (Total) 1 -   Swollen Joint Count (Total) 1 -     DATA REVIEW    Laboratory     Recent laboratory results were reviewed, summarized, and discussed with the patient.     Imaging    Musculoskeletal Ultrasound    None    Radiographs    None    CT Imaging    None    MR Imaging    None    DXA     None    ASSESSMENT AND PLAN    This is a follow-up visit for Ms. Apryl Bustamante. 1) Undifferentiated Inflammatory Arthritis versus Hypermobility Syndrome. I did not appreciate synovitis on her initial exam but she has a tender and swollen joint in her right 3rd PIP. She has vague inflammatory symptoms    She has been on hydroxychloroquine 400 mg daily with resolution of her wrist symptoms. She continues to have hand stiffness which I believe is from her hypermobility syndrome. She wants to wean off hydroxychloroquine because she does not know if it is helping, so I asked her to lower her dose to 200 mg daily for 2 months and then to 0 mg daily if no symptoms but to resume the previous effective dose if she does have recurrence. She was also asked to inform me if that happens. I refilled it. 2) Medial Wrist Tendinitis. This was alexi reproducible with resistance maneuvers. I did not appreciate synovitis.    3) Left Shoulder Rotator Cuff Tendinitis. I suspected she has irritated her teres minor tendon when she was reaching over. I was previously able to reproduce this on exam with internal rotation adduction with pushing off her back. This had slowly improved over time.    4) Hypermobility Syndrome. Her Beighton Score was at least 4.     5) Long Term Use of Hydroxychloroquine (Plaquenil). She will attempt to wean off. The patient voiced understanding of the aforementioned assessment and plan. The patient has consented for synchronous (real-time) Telemedicine (audio-video technology) on 12/29/2020 for their care to be delivered over telemedicine in place of their regularly scheduled office visit pursuant to the emergency declaration under the [facility], [facility] waiver authority and the [employer] Act, this Virtual  Visit was conducted, with patient's consent, to reduce the patient's risk of exposure to COVID-19 and provide continuity of care for an established patient.      Services were provided through a video synchronous discussion virtually to substitute for in-person clinic visit. TODAY'S ORDERS    Orders Placed This Encounter    hydrOXYchloroQUINE (PLAQUENIL) 200 mg tablet     No future appointments.   Crayton Phalen, MD, [address] [facility]    Adult Rheumatology   Rheumatology Ultrasound Certified  [address]  Kiran Snider [place], [address] [facility]   Phone [phone]  Fax [phone]

## 2021-02-19 DIAGNOSIS — G43.819 OTHER MIGRAINE WITHOUT STATUS MIGRAINOSUS, INTRACTABLE: ICD-10-CM

## 2021-02-19 RX ORDER — VALACYCLOVIR HYDROCHLORIDE 1 G/1
TABLET, FILM COATED ORAL
Qty: 12 TAB | Refills: 3 | Status: SHIPPED | OUTPATIENT
Start: 2021-02-19 | End: 2022-02-01 | Stop reason: SDUPTHER

## 2021-02-19 RX ORDER — BUTALBITAL, ACETAMINOPHEN AND CAFFEINE 50; 325; 40 MG/1; MG/1; MG/1
TABLET ORAL
Qty: 30 TAB | Refills: 0 | Status: SHIPPED | OUTPATIENT
Start: 2021-02-19 | End: 2022-02-01 | Stop reason: SDUPTHER

## 2021-02-19 RX ORDER — ELETRIPTAN HYDROBROMIDE 40 MG/1
TABLET, FILM COATED ORAL
Qty: 12 TAB | Refills: 1 | Status: SHIPPED | OUTPATIENT
Start: 2021-02-19 | End: 2022-01-11 | Stop reason: SDUPTHER

## 2021-04-08 DIAGNOSIS — M35.9 UNDIFFERENTIATED CONNECTIVE TISSUE DISEASE (HCC): ICD-10-CM

## 2021-04-08 RX ORDER — HYDROXYCHLOROQUINE SULFATE 200 MG/1
400 TABLET, FILM COATED ORAL DAILY
Qty: 180 TAB | Refills: 3 | Status: SHIPPED | OUTPATIENT
Start: 2021-04-08 | End: 2021-10-04 | Stop reason: SDUPTHER

## 2021-04-25 RX ORDER — TRIAMCINOLONE ACETONIDE 1 MG/G
OINTMENT TOPICAL 2 TIMES DAILY
Qty: 80 G | Refills: 0 | Status: SHIPPED | OUTPATIENT
Start: 2021-04-25 | End: 2022-02-01

## 2021-09-14 ENCOUNTER — TELEPHONE (OUTPATIENT)
Dept: INTERNAL MEDICINE CLINIC | Age: 41
End: 2021-09-14

## 2021-09-14 DIAGNOSIS — Z00.00 ROUTINE GENERAL MEDICAL EXAMINATION AT A HEALTH CARE FACILITY: Primary | ICD-10-CM

## 2021-09-14 DIAGNOSIS — Z00.00 WELL ADULT EXAM: ICD-10-CM

## 2021-09-14 NOTE — TELEPHONE ENCOUNTER
Spoke with patient regarding [facility] Lab Lipid Profile which has not been drawn since 7/2019. Patient verbalized agreement to have lipid profile drawn at the [facility] on [facility] at the [address] [place] [address]. Patient verbalized understanding to be NPO after midnight before having lipid panel drawn by [facility]. Patient states that waist size in inches is approximately 28. PCP is aware & has given verbal order for [facility] Well Lab Profile to be ordered. Be Well lab profile/ order will be faxed to the [facility] requested by patient this morning. Fax result report shows successful transmission to [facility] ([address] [place] [address] ) ph# [phone] & fax# [phone].

## 2021-09-17 ENCOUNTER — TRANSCRIBE ORDER (OUTPATIENT)
Dept: SCHEDULING | Age: 41
End: 2021-09-17

## 2021-09-17 ENCOUNTER — TELEPHONE (OUTPATIENT)
Dept: INTERNAL MEDICINE CLINIC | Age: 41
End: 2021-09-17

## 2021-09-17 DIAGNOSIS — Z12.31 VISIT FOR SCREENING MAMMOGRAM: Primary | ICD-10-CM

## 2021-09-17 LAB
CHOLEST SERPL-MCNC: 170 MG/DL (ref 100–199)
HDLC SERPL-MCNC: 70 MG/DL
IMP & REVIEW OF LAB RESULTS: NORMAL
LDLC SERPL CALC-MCNC: 88 MG/DL (ref 0–99)
TRIGL SERPL-MCNC: 64 MG/DL (ref 0–149)
VLDLC SERPL CALC-MCNC: 12 MG/DL (ref 5–40)

## 2021-09-17 NOTE — TELEPHONE ENCOUNTER
Recv'd patient's lipid results; BP value from patient's  via staff message (546/76), patient reported waist at 28 inches, nurse gathered other necessary [facility] Screening values from patient's chart. [facility] Screening form completed & faxed to [address] [facility] Screening department (fax# [phone]). Fax result report shows successful transmission. PCP notified.

## 2021-09-28 ENCOUNTER — VIRTUAL VISIT (OUTPATIENT)
Dept: INTERNAL MEDICINE CLINIC | Age: 41
End: 2021-09-28
Payer: COMMERCIAL

## 2021-09-28 ENCOUNTER — TELEPHONE (OUTPATIENT)
Dept: INTERNAL MEDICINE CLINIC | Age: 41
End: 2021-09-28

## 2021-09-28 DIAGNOSIS — F43.9 SITUATIONAL STRESS: Primary | ICD-10-CM

## 2021-09-28 PROCEDURE — 99214 OFFICE O/P EST MOD 30 MIN: CPT | Performed by: INTERNAL MEDICINE

## 2021-09-28 NOTE — PROGRESS NOTES
Diagnoses and all orders for this visit:    1. Situational stress   Patient with recent psychological trauma    Her PHQ 2 score is 2  Her DAVID-7 score is 5    Increase buspar to 30 mg in the evening   We will try to increase light exercise exercise    Counselor if needed and we can change medication to Lexapro or low-dose sertraline    Called patient and notes that she did have some issues with Lexapro and Zoloft with sexual side effects but not opposed to retrying if needed. We will try to get her back sleeping and she will follow up with me in 1 to 2 weeks  I do advocate a counselor given history of situational trigger      Chief Complaint   Patient presents with    Depression     not sleeping - started getting worse in the last 6 months      Situational stress    Patient reports a 6 months ago child was murdered in neighborhood. She notes that her own kids play over at this house. She has found that she has been dwelling around it. Obsessive about it, constantly thinking it should be    [employer] and her got house across the street, last week  Had panick and crying for a few days. She recognizes that this was not like her.   Not sleeping use to sleep 8 hours  Last 4 days slept 12 hours total      Anxiety overall controlled around the kids    She reports she was taking BuSpar twice a day  Am dose caused dizziness and would not take if had to drive  At night she took 20 mg    Depression sx  She acknowledges she is not as happy or generally happy as she has in the past      Past Medical History:   Diagnosis Date    Abnormal Pap smear     Colposcopy in 2002    Headache     migraines    Herpes simplex without mention of complication     Cold sores (Oct 2013)    Ill-defined condition     rosacia    Infertility     IVF with first 3 pregnancies, not this pregnancy    Unspecified breast disorder     biospy right breast,galactocele     Past Surgical History:   Procedure Laterality Date    HX BREAST AUGMENTATION Bilateral 2015    BILATERAL BREAST AUGMENTATION W RIGHT BREAST PERIAREOLAR MASTOPEXY SILICONE  performed by Michael Evans MD at [address] HX BREAST AUGMENTATION Bilateral 2015    [address] [facility] [address]    HX BREAST REDUCTION Right 2015    BREAST MASTOPEXY performed by Michael Evans MD at [address] HX  SECTION      HX  SECTION      HX LEEP PROCEDURE      HX OTHER SURGICAL      Laproscopy for Infertility    HX OTHER SURGICAL      LEEP    HX OTHER SURGICAL      D&C    IMPLANT BREAST SILICONE/EQ Bilateral         AL  DELIVERY ONLY  2012    Twins    AL EXPLORATORY OF ABDOMEN      For infertility     Social History     Socioeconomic History    Marital status:      Spouse name: Not on file    Number of children: Not on file    Years of education: Not on file    Highest education level: Not on file   Tobacco Use    Smoking status: Never Smoker    Smokeless tobacco: Never Used   Vaping Use    Vaping Use: Never used   Substance and Sexual Activity    Alcohol use: Yes     Alcohol/week: 2.0 standard drinks     Types: 1 Glasses of wine, 1 Cans of beer per week     Comment: rarely    Drug use: No    Sexual activity: Yes     Partners: Male     Birth control/protection: Surgical     Comment: vasectomy   Social History Narrative     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 home with the kids        4 children    10, 6 6and 8year old    All healthy            Exercise     Gym 3 times/week     Social Determinants of Health     Financial Resource Strain:     Difficulty of Paying Living Expenses:    Food Insecurity:     Worried About Running Out of Food in the Last Year:     [address] in the Last Year:    Transportation Needs:     Lack of Transportation (Medical):      Lack of Transportation (Non-Medical):    Physical Activity:     Days of Exercise per Week:     Minutes of Exercise per Session:    Stress:     Feeling of Stress :    Social Connections:     Frequency of Communication with Friends and Family:     Frequency of Social Gatherings with Friends and Family:     Attends Synagogue Services:     Active Member of Clubs or Organizations:     Attends Club or Organization Meetings:     Marital Status:      Family History   Problem Relation Age of Onset    Hypertension Father     Asthma Brother     Elevated Lipids Maternal Grandfather     Elevated Lipids Paternal Grandmother     Hypertension Paternal Grandmother     No Known Problems Mother      Current Outpatient Medications   Medication Sig Dispense Refill    busPIRone (BUSPAR) 10 mg tablet TAKE 1 AND 1/2 TABLETS BY MOUTH TWO TIMES A  Tablet 0    hydrOXYchloroQUINE (PLAQUENIL) 200 mg tablet Take 2 Tabs by mouth daily. 180 Tab 3    eletriptan (Relpax) 40 mg tablet may repeat in 2 hours if necessary. Max 80 mg/day. Do not take with fiorcet 12 Tab 1    valACYclovir (VALTREX) 1 gram tablet TAKE 2 TABLETS BY MOUTH NOW &REPEAT IN 12HRS. *4 TABLETS TOTAL PER OUTBREAK*, 12 Tab 3    butalbital-acetaminophen-caffeine (FIORICET, ESGIC) -40 mg per tablet Take 1.5 tabs po only as needed daily for migraine  Indications: a migraine headache 30 Tab 0    melatonin 5 mg tablet Take 10 mg by mouth nightly.  fluticasone (FLONASE ALLERGY RELIEF) 50 mcg/actuation nasal spray 2 Sprays by Both Nostrils route as needed.  ibuprofen (MOTRIN) 800 mg tablet Take 800 mg by mouth every eight (8) hours as needed for Pain.  triamcinolone acetonide (KENALOG) 0.1 % ointment Apply  to affected area two (2) times a day.  use thin layer BID PRN (Patient not taking: Reported on 9/28/2021) 80 g 0    FINACEA 15 % topical gel  (Patient not taking: Reported on 9/28/2021)  3     No Known Allergies    Review of Systems - General ROS: negative for - chills or fever  Cardiovascular ROS: no chest pain or dyspnea on exertion  Respiratory ROS: no cough, shortness of breath, or wheezing    There were no vitals taken for this visit. Constitutional: [x] Appears well-developed and well-nourished [x] No apparent distress      [] Abnormal -     Mental status: [x] Alert and awake  [x] Oriented to person/place/time [x] Able to follow commands    [] Abnormal -     Eyes:   EOM    [x]  Normal    [] Abnormal -   Sclera  [x]  Normal    [] Abnormal -          Discharge [x]  None visible   [] Abnormal -     HENT: [x] Normocephalic, atraumatic  [] Abnormal -   [x] Mouth/Throat: Mucous membranes are moist    External Ears [x] Normal  [] Abnormal -    Neck: [x] No visualized mass [] Abnormal -     Pulmonary/Chest: [x] Respiratory effort normal   [x] No visualized signs of difficulty breathing or respiratory distress        [] Abnormal -      Musculoskeletal:   [x] Normal gait with no signs of ataxia         [x] Normal range of motion of neck        [] Abnormal -     Neurological:        [x] No Facial Asymmetry (Cranial nerve 7 motor function) (limited exam due to video visit)          [x] No gaze palsy        [] Abnormal -          Skin:        [x] No significant exanthematous lesions or discoloration noted on facial skin         [] Abnormal -            Psychiatric:       [x] Normal Affect [] Abnormal -        [x] No Hallucinations      ATTENTION:   This medical record was transcribed using an electronic medical records/speech recognition system. Although proofread, it may and can contain electronic, spelling and other errors. Corrections may be executed at a later time. Please contact us for any clarifications as needed. On this date 09/28/21  I have spent 30 minutes reviewing previous notes, test results and face to face with the patient discussing the diagnosis and importance of compliance with the treatment plan as well as documenting on the day of the visit. Video this is a virtual visit. I was located in my office and the patient was located in his/her home.   Pt has given consent and aware this visit will be billed to his/her health insurance.

## 2021-09-28 NOTE — TELEPHONE ENCOUNTER
----- Message from [employer] sent at 9/28/2021  7:52 AM EDT -----  Regarding: /Telephone  Appointment not available    Caller's first and last name and relationship to patient (if not the patient):      Best contact number: [phone]      Preferred date and time: Next Available Virtual       Scheduled appointment date and time: N/A      Reason for appointment: Depression       Details to clarify the request: Needs to speak with PCP for depression concerns,      Colten

## 2021-10-04 DIAGNOSIS — M35.9 UNDIFFERENTIATED CONNECTIVE TISSUE DISEASE (HCC): ICD-10-CM

## 2021-10-04 RX ORDER — HYDROXYCHLOROQUINE SULFATE 200 MG/1
400 TABLET, FILM COATED ORAL DAILY
Qty: 180 TABLET | Refills: 3 | Status: SHIPPED | OUTPATIENT
Start: 2021-10-04 | End: 2022-01-31

## 2021-10-04 RX ORDER — HYDROXYCHLOROQUINE SULFATE 200 MG/1
400 TABLET, FILM COATED ORAL DAILY
Qty: 180 TABLET | Refills: 3 | Status: SHIPPED | OUTPATIENT
Start: 2021-10-04 | End: 2021-10-04 | Stop reason: SDUPTHER

## 2021-10-21 ENCOUNTER — HOSPITAL ENCOUNTER (OUTPATIENT)
Dept: MAMMOGRAPHY | Age: 41
Discharge: HOME | End: 2021-10-21
Attending: INTERNAL MEDICINE
Payer: COMMERCIAL

## 2021-10-21 DIAGNOSIS — Z12.31 VISIT FOR SCREENING MAMMOGRAM: ICD-10-CM

## 2021-10-21 PROCEDURE — 77063 BREAST TOMOSYNTHESIS BI: CPT

## 2021-12-21 ENCOUNTER — E-VISIT (OUTPATIENT)
Dept: INTERNAL MEDICINE CLINIC | Age: 41
End: 2021-12-21

## 2021-12-21 ENCOUNTER — VIRTUAL VISIT (OUTPATIENT)
Dept: INTERNAL MEDICINE CLINIC | Age: 41
End: 2021-12-21
Payer: COMMERCIAL

## 2021-12-21 DIAGNOSIS — N30.00 ACUTE CYSTITIS WITHOUT HEMATURIA: Primary | ICD-10-CM

## 2021-12-21 PROCEDURE — 99213 OFFICE O/P EST LOW 20 MIN: CPT | Performed by: INTERNAL MEDICINE

## 2021-12-21 RX ORDER — SULFAMETHOXAZOLE AND TRIMETHOPRIM 800; 160 MG/1; MG/1
1 TABLET ORAL 2 TIMES DAILY
Qty: 10 TABLET | Refills: 0 | Status: SHIPPED | OUTPATIENT
Start: 2021-12-21 | End: 2022-02-01

## 2021-12-21 NOTE — PROGRESS NOTES
Diagnoses and all orders for this visit:    1. Acute cystitis without hematuria  Not improving despite fluids/azo  -     trimethoprim-sulfamethoxazole (BACTRIM DS, SEPTRA DS) 160-800 mg per tablet; Take 1 Tablet by mouth two (2) times a day. Pt with her 4 kids and can't get to lab        Chief Complaint   Patient presents with    Urgency     x 2 days pt took azo today    Urinary Burning       Subjective:     Hermelinda Jraa is a 42 y.o. female who complains of dysuria, frequency, urgency for 2 days. Patient also complains of suprapubic pain. Patient denies back pain, congestion, cough and fever. Patient does not have a history of recurrent UTI. Patient do have a history of pyelonephritis.   UTI in a long time 2 days ago and has been getting worse  Okay2 days ago and  No blood in urine  No history of kidney stones    Past Medical History:   Diagnosis Date    Abnormal Pap smear     Colposcopy in     Headache     migraines    Herpes simplex without mention of complication     Cold sores (Oct 2013)    Ill-defined condition     rosacia    Infertility     IVF with first 3 pregnancies, not this pregnancy    Unspecified breast disorder     biospy right breast,galactocele     Past Surgical History:   Procedure Laterality Date    HX BREAST AUGMENTATION Bilateral 2015    BILATERAL BREAST AUGMENTATION W RIGHT BREAST PERIAREOLAR MASTOPEXY SILICONE  performed by Gloria Contreras MD at [address] [place] [address] Street HX BREAST AUGMENTATION Bilateral 2015    [facility]    HX BREAST REDUCTION Right 2015    BREAST MASTOPEXY performed by Gloria Contreras MD at [address] [place] [address] Street HX  SECTION      HX  SECTION      HX LEEP PROCEDURE      HX OTHER SURGICAL      Laproscopy for Infertility    HX OTHER SURGICAL      LEEP    HX OTHER SURGICAL      D&C    IMPLANT BREAST SILICONE/EQ Bilateral         OK  DELIVERY ONLY  2012    Twins    OK EXPLORATORY OF ABDOMEN For infertility     Social History     Socioeconomic History    Marital status:    Tobacco Use    Smoking status: Never Smoker    Smokeless tobacco: Never Used   Vaping Use    Vaping Use: Never used   Substance and Sexual Activity    Alcohol use: Yes     Alcohol/week: 2.0 standard drinks     Types: 1 Glasses of wine, 1 Cans of beer per week     Comment: rarely    Drug use: No    Sexual activity: Yes     Partners: Male     Birth control/protection: Surgical     Comment: vasectomy   Social History Narrative     many years ago    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    At home with the kids        4 children    10, 6 6and 8year old    All healthy            Exercise     Gym 3 times/week     Family History   Problem Relation Age of Onset    Hypertension Father     Asthma Brother     Elevated Lipids Maternal Grandfather     Elevated Lipids Paternal Grandmother     Hypertension Paternal Grandmother     No Known Problems Mother      Current Outpatient Medications   Medication Sig Dispense Refill    busPIRone (BUSPAR) 10 mg tablet TAKE ONE AND ONE-HALF TABLETS BY MOUTH TWICE A  Tablet 0    hydrOXYchloroQUINE (PLAQUENIL) 200 mg tablet Take 2 Tablets by mouth daily. 180 Tablet 3    eletriptan (Relpax) 40 mg tablet may repeat in 2 hours if necessary. Max 80 mg/day. Do not take with fiorcet 12 Tab 1    valACYclovir (VALTREX) 1 gram tablet TAKE 2 TABLETS BY MOUTH NOW &REPEAT IN 12HRS. *4 TABLETS TOTAL PER OUTBREAK*, 12 Tab 3    butalbital-acetaminophen-caffeine (FIORICET, ESGIC) -40 mg per tablet Take 1.5 tabs po only as needed daily for migraine  Indications: a migraine headache 30 Tab 0    melatonin 5 mg tablet Take 10 mg by mouth nightly.  fluticasone (FLONASE ALLERGY RELIEF) 50 mcg/actuation nasal spray 2 Sprays by Both Nostrils route as needed.  ibuprofen (MOTRIN) 800 mg tablet Take 800 mg by mouth every eight (8) hours as needed for Pain.       triamcinolone acetonide (KENALOG) 0.1 % ointment Apply  to affected area two (2) times a day. use thin layer BID PRN (Patient not taking: Reported on 9/28/2021) 80 g 0    FINACEA 15 % topical gel  (Patient not taking: Reported on 9/28/2021)  3     No Known Allergies    Review of Systems - General ROS: negative for - chills or fever  Cardiovascular ROS: no chest pain or dyspnea on exertion  Respiratory ROS: no cough, shortness of breath, or wheezing    There were no vitals taken for this visit. Constitutional: [x] Appears well-developed and well-nourished [x] No apparent distress      [] Abnormal -     Mental status: [x] Alert and awake  [x] Oriented to person/place/time [x] Able to follow commands    [] Abnormal -     Eyes:   EOM    [x]  Normal    [] Abnormal -   Sclera  [x]  Normal    [] Abnormal -          Discharge [x]  None visible   [] Abnormal -     HENT: [x] Normocephalic, atraumatic  [] Abnormal -   [x] Mouth/Throat: Mucous membranes are moist    External Ears [x] Normal  [] Abnormal -    Neck: [x] No visualized mass [] Abnormal -     Pulmonary/Chest: [x] Respiratory effort normal   [x] No visualized signs of difficulty breathing or respiratory distress        [] Abnormal -      Musculoskeletal:   [x] Normal gait with no signs of ataxia         [x] Normal range of motion of neck        [] Abnormal -     Neurological:        [x] No Facial Asymmetry (Cranial nerve 7 motor function) (limited exam due to video visit)          [x] No gaze palsy        [] Abnormal -          Skin:        [x] No significant exanthematous lesions or discoloration noted on facial skin         [] Abnormal -            Psychiatric:       [x] Normal Affect [] Abnormal -        [x] No Hallucinations      ATTENTION:   This medical record was transcribed using an electronic medical records/speech recognition system. Although proofread, it may and can contain electronic, spelling and other errors. Corrections may be executed at a later time.   Please contact us for any clarifications as needed. Video this is a virtual visit. I was located in my office and the patient was located in his/her home. Pt has given consent and aware this visit will be billed to his/her health insurance.

## 2021-12-29 ENCOUNTER — VIRTUAL VISIT (OUTPATIENT)
Dept: RHEUMATOLOGY | Age: 41
End: 2021-12-29
Payer: COMMERCIAL

## 2021-12-29 DIAGNOSIS — M35.7 HYPERMOBILITY SYNDROME: ICD-10-CM

## 2021-12-29 DIAGNOSIS — M35.9 UNDIFFERENTIATED CONNECTIVE TISSUE DISEASE (HCC): Primary | ICD-10-CM

## 2021-12-29 PROCEDURE — 99215 OFFICE O/P EST HI 40 MIN: CPT | Performed by: INTERNAL MEDICINE

## 2021-12-29 NOTE — PROGRESS NOTES
REASON FOR VISIT    This is a follow-up visit for Ms. Michelle Aguirre for     ICD-10-CM   1. Undifferentiated connective tissue disease ([facility]  M35.9     The patient has consented for synchronous (real-time) Telemedicine (audio-video technology) on 12/29/2021 for their care to be delivered over telemedicine in place of their regularly scheduled office visit pursuant to the emergency declaration under the [address] [place] [address] [facility] authority and the [employer] Act, this Virtual  Visit was conducted, with patient's consent, to reduce the patient's risk of exposure to COVID-19 and provide continuity of care for an established patient. Services were provided through a video synchronous discussion virtually to substitute for in-person clinic visit. Inflammatory arthritis phenotype includes:  Anti-CCP positive: no  Rheumatoid factor positive: no  Erosive disease: N/A  Extra-articular manifestations include: benign hypermobility syndrome    Immunosuppression Screening:  Quantiferon TB: Not performed  PPD:  Not performed  Hepatitis B: Not performed  Hepatitis C: Not performed    Therapy History includes:  Current DMARD therapy include: hydroxychloroquine 400 mg daily (9/30/2020 to present)  Prior DMARD therapy include: none  Discontinued DMARDs because of inefficacy: None  Discontinued DMARDs because of side effects: None    HISTORY OF PRESENT ILLNESS    Ms. Michelle Aguirre returns for a follow-up. On her last visit on 12/29/2020, she wanted to wean off hydroxychloroquine because she did not know if it was helping but she did not. She continued it and did stop them 3 weeks ago and has not felt any worse. Today, she feels no difference being off HCQ. She has stiffness in her fingers lasting minutes (up to 5 minutes) which tends to be worse when she wakes up.  She feels there is sometimes tightness in her hands that is all day but described as not being able to bend her fingers well. This tends to improve with activity and worsen at rest. These symptoms did not change while on HCQ or off it. She denies wrist pain, swelling, or stiffness. She has not been playing tennis recently but did not have limitation. Most toxicity monitoring by blood work was done on 10/08/2020 and did not reveal any significant adverse effects. REVIEW OF SYSTEMS    A comprehensive review of systems was performed and pertinent results are documented in the HPI, review of systems is otherwise non-contributory. PAST MEDICAL HISTORY    She has a past medical history of Abnormal Pap smear, Headache, Herpes simplex without mention of complication, Ill-defined condition, Infertility, and Unspecified breast disorder. FAMILY HISTORY    Her family history includes Asthma in her brother; Elevated Lipids in her maternal grandfather and paternal grandmother; Hypertension in her father and paternal grandmother; No Known Problems in her mother. SOCIAL HISTORY    She reports that she has never smoked. She has never used smokeless tobacco. She reports current alcohol use of about 2.0 standard drinks of alcohol per week. She reports that she does not use drugs. MEDICATIONS    Current Outpatient Medications   Medication Sig    trimethoprim-sulfamethoxazole (BACTRIM DS, SEPTRA DS) 160-800 mg per tablet Take 1 Tablet by mouth two (2) times a day.  busPIRone (BUSPAR) 10 mg tablet TAKE ONE AND ONE-HALF TABLETS BY MOUTH TWICE A DAY    hydrOXYchloroQUINE (PLAQUENIL) 200 mg tablet Take 2 Tablets by mouth daily.  triamcinolone acetonide (KENALOG) 0.1 % ointment Apply  to affected area two (2) times a day. use thin layer BID PRN (Patient not taking: Reported on 9/28/2021)    eletriptan (Relpax) 40 mg tablet may repeat in 2 hours if necessary. Max 80 mg/day. Do not take with fiorcet    valACYclovir (VALTREX) 1 gram tablet TAKE 2 TABLETS BY MOUTH NOW &REPEAT IN 12HRS.  *4 TABLETS TOTAL PER OUTBREAK*,    butalbital-acetaminophen-caffeine (FIORICET, ESGIC) -40 mg per tablet Take 1.5 tabs po only as needed daily for migraine  Indications: a migraine headache    melatonin 5 mg tablet Take 10 mg by mouth nightly.  fluticasone (FLONASE ALLERGY RELIEF) 50 mcg/actuation nasal spray 2 Sprays by Both Nostrils route as needed.  ibuprofen (MOTRIN) 800 mg tablet Take 800 mg by mouth every eight (8) hours as needed for Pain.  FINACEA 15 % topical gel  (Patient not taking: Reported on 9/28/2021)     No current facility-administered medications for this visit. ALLERGIES    No Known Allergies    PHYSICAL EXAMINATION    There were no vitals taken for this visit. There is no height or weight on file to calculate BMI. General: Patient is alert, oriented x 3, not in acute distress    HEENT:   Sclerae are not injected and appear moist.  There is no alopecia. Chest:  Breathing comfortably at room air    Musculoskeletal exam:  A comprehensive musculoskeletal exam was NOT performed for all joints of each upper and lower extremity and assessed for swelling, tenderness and range of motion. Positive results are documented as below:    Previous Exam    NROM active ROM of shoulders  Pain along the teres minor with shoulder internal rotation/adduction  Negative Empty can sign  No tenderness along the common extensor or common flexor tendons  Pain along the ulnar tendons with wrist resistance maneuvers (flexion and external rotation)  Bilateral patellar laxity without tenderness    Beighton Score Score >=4    Joint Count 9/30/2020 5/12/2020   MHAQ - 0   Right 3rd PIP - Tender 1 -   Right 3rd PIP - Swollen 1 -   Tender Joint Count (Total) 1 -   Swollen Joint Count (Total) 1 -     DATA REVIEW    Laboratory     Recent laboratory results were reviewed, summarized, and discussed with the patient.     Imaging    Musculoskeletal Ultrasound    None    Radiographs    None    CT Imaging    None    MR [None] : Patient does not have any barriers to medication adherence Imaging    None    DXA     None    ASSESSMENT AND PLAN    This is a follow-up visit for Ms. Flako Bhardwaj. 1) Undifferentiated Inflammatory Arthritis versus Hypermobility Syndrome. I did not appreciate synovitis on her initial exam but she had a tender and swollen joint in her right 3rd PIP. She had vague inflammatory symptoms    She has been on hydroxychloroquine 400 mg daily with resolution of her wrist symptoms but continued to have hand stiffness which I felt was from her hypermobility syndrome. She stopped HCQ last month and she has not felt worse. I recommend monitoring off treatment for now unless things resurface. I ordered hand radiographs. 2) Hypermobility Syndrome. Her Beighton Score was at least 4.     3) Long Term Use of Hydroxychloroquine (Plaquenil). She discontinued last month    The patient voiced understanding of the aforementioned assessment and plan. The patient has consented for synchronous (real-time) Telemedicine (audio-video technology) on 12/29/2021 for their care to be delivered over telemedicine in place of their regularly scheduled office visit pursuant to the emergency declaration under the [facility], [facility] waiver authority and the [employer] Act, this Virtual  Visit was conducted, with patient's consent, to reduce the patient's risk of exposure to COVID-19 and provide continuity of care for an established patient. A total of 41 minutes was spent on this visit, reviewing interval notes, interval testing results, ordering tests, refilling medications, documenting the findings in the note, patient education, counseling, and coordination of care as described above. All questions asked and answered. Services were provided through a video synchronous discussion virtually to substitute for in-person clinic visit.     TODAY'S ORDERS    Orders Placed This Encounter    XR HAND LT MIN 3 V    [Takes medication as prescribed] : takes XR HAND RT MIN 3 V     Future Appointments   Date Time Provider Cooper Stovall   3/30/2022  1:00 PM Santiago Chen MD [facility] BS [facility]B     Jazmin Sommers MD, [address] [place] [address]    Adult Rheumatology   Rheumatology Ultrasound Certified  [address]  [facility]   Phone [phone]  Fax [phone]

## 2022-01-11 ENCOUNTER — PATIENT MESSAGE (OUTPATIENT)
Dept: INTERNAL MEDICINE CLINIC | Age: 42
End: 2022-01-11

## 2022-01-11 ENCOUNTER — APPOINTMENT (OUTPATIENT)
Dept: FAMILY MEDICINE CLINIC | Age: 42
End: 2022-01-11

## 2022-01-11 DIAGNOSIS — G43.819 OTHER MIGRAINE WITHOUT STATUS MIGRAINOSUS, INTRACTABLE: ICD-10-CM

## 2022-01-11 RX ORDER — ELETRIPTAN HYDROBROMIDE 40 MG/1
TABLET, FILM COATED ORAL
Qty: 12 TABLET | Refills: 1 | Status: SHIPPED | OUTPATIENT
Start: 2022-01-11 | End: 2022-02-01 | Stop reason: SDUPTHER

## 2022-01-11 NOTE — TELEPHONE ENCOUNTER
From: Denae Orozco  To: Gwyndolyn Nissen, MD  Sent: 1/11/2022 9:53 AM EST  Subject: Refill request    Can I please get a refill on Relpax sent to [facility]? Thanks.

## 2022-01-21 ENCOUNTER — DOCUMENTATION ONLY (OUTPATIENT)
Dept: INTERNAL MEDICINE CLINIC | Age: 42
End: 2022-01-21

## 2022-01-21 NOTE — PROGRESS NOTES
Incoming call from patient pharmacy [facility]. Nurse gave  Verbal  to pharmacist for relpax 40mg for correction of sig: take one tab PO may repeat in 2 hours if necessary. Max 80 mg/day. Do not take with fiorcet.

## 2022-02-01 ENCOUNTER — OFFICE VISIT (OUTPATIENT)
Dept: INTERNAL MEDICINE CLINIC | Age: 42
End: 2022-02-01
Payer: COMMERCIAL

## 2022-02-01 VITALS
HEIGHT: 69 IN | SYSTOLIC BLOOD PRESSURE: 116 MMHG | HEART RATE: 75 BPM | DIASTOLIC BLOOD PRESSURE: 83 MMHG | RESPIRATION RATE: 16 BRPM | OXYGEN SATURATION: 98 % | BODY MASS INDEX: 20.59 KG/M2 | TEMPERATURE: 97.6 F | WEIGHT: 139 LBS

## 2022-02-01 DIAGNOSIS — F41.1 GENERALIZED ANXIETY DISORDER: ICD-10-CM

## 2022-02-01 DIAGNOSIS — G43.819 OTHER MIGRAINE WITHOUT STATUS MIGRAINOSUS, INTRACTABLE: ICD-10-CM

## 2022-02-01 DIAGNOSIS — Z12.4 CERVICAL CANCER SCREENING: ICD-10-CM

## 2022-02-01 DIAGNOSIS — M35.9 UNDIFFERENTIATED CONNECTIVE TISSUE DISEASE (HCC): ICD-10-CM

## 2022-02-01 DIAGNOSIS — Z11.59 ENCOUNTER FOR HEPATITIS C SCREENING TEST FOR LOW RISK PATIENT: ICD-10-CM

## 2022-02-01 DIAGNOSIS — E61.1 IRON DEFICIENCY: Primary | ICD-10-CM

## 2022-02-01 PROCEDURE — 99214 OFFICE O/P EST MOD 30 MIN: CPT | Performed by: INTERNAL MEDICINE

## 2022-02-01 RX ORDER — BUTALBITAL, ACETAMINOPHEN AND CAFFEINE 50; 325; 40 MG/1; MG/1; MG/1
TABLET ORAL
Qty: 30 TABLET | Refills: 2 | Status: SHIPPED | OUTPATIENT
Start: 2022-02-01

## 2022-02-01 RX ORDER — BUSPIRONE HYDROCHLORIDE 10 MG/1
TABLET ORAL
Qty: 270 TABLET | Refills: 3 | Status: SHIPPED | OUTPATIENT
Start: 2022-02-01

## 2022-02-01 RX ORDER — ELETRIPTAN HYDROBROMIDE 40 MG/1
TABLET, FILM COATED ORAL
Qty: 12 TABLET | Refills: 1 | Status: SHIPPED | OUTPATIENT
Start: 2022-02-01 | End: 2022-08-28

## 2022-02-01 RX ORDER — VALACYCLOVIR HYDROCHLORIDE 1 G/1
TABLET, FILM COATED ORAL
Qty: 12 TABLET | Refills: 3 | Status: SHIPPED | OUTPATIENT
Start: 2022-02-01

## 2022-02-01 RX ORDER — CALC/MAG/B COMPLEX/D3/HERB 61
15 TABLET ORAL
Qty: 90 CAPSULE | Refills: 1 | Status: SHIPPED | OUTPATIENT
Start: 2022-02-01 | End: 2022-10-17 | Stop reason: SDUPTHER

## 2022-02-01 NOTE — PROGRESS NOTES
Chief Complaint   Patient presents with    Follow-up     Patient is here for follow-up regarding headache medications. Non specified arthritis  She has been followed by Dr. Jose Solares but will now transition to Dr. Zainab Jauregui. She has been off Plaquenil since mid December. She notes that it helped some symptoms but did not really help her hand symptoms. Headaches  Patient reports that her headaches are overall well controlled. She has headaches related to her menses. She has about 3 levels of headaches. 1 type of headache she can abort with ibuprofen. She also has very severe headaches that she uses Relpax. Other times she uses Fioricet. She reports she has had migraines and headaches for about 20 years. She has a good feel for which medications work depending on her symptoms. She does not feel like she needs prophylactic medication. GYN  Follows with Dr. Bibi Cheatham    Anemia  Vegetarian  No supplements    Situational stress  She has 4 natural children 8, 9,9, 11. She and her  also have 2 foster children 10years old who are identical twins. They also in the house. She is using BuSpar 10 mg 3 times daily. She notes this works pretty well. She has been sleeping well. She moved in November which was somewhat stressful but overall stable. She is taking Benadryl 25 mg for sleep and 10 mg melatonin. She notes her sleep is very good        Cold sores  Patient has outbreaks once every 3 months.         Past Medical History:   Diagnosis Date    Abnormal Pap smear     Colposcopy in 2002    Headache     migraines    Herpes simplex without mention of complication     Cold sores (Oct 2013)    Ill-defined condition     rosacia    Infertility     IVF with first 3 pregnancies, not this pregnancy    Unspecified breast disorder     biospy right breast,galactocele     Past Surgical History:   Procedure Laterality Date    HX BREAST AUGMENTATION Bilateral 11/25/2015    BILATERAL BREAST AUGMENTATION W RIGHT BREAST PERIAREOLAR MASTOPEXY SILICONE  performed by Shantal Waller MD at [address] [place] [address] HX BREAST AUGMENTATION Bilateral 2015    [facility] HAIR- , Varsha Day    HX BREAST REDUCTION Right 2015    BREAST MASTOPEXY performed by Shantal Waller MD at [address] [place] [address] HX  SECTION  2012    HX  SECTION      HX LEEP PROCEDURE      HX OTHER SURGICAL      Laproscopy for Infertility    HX OTHER SURGICAL      LEEP    HX OTHER SURGICAL      D&C    IMPLANT BREAST SILICONE/EQ Bilateral         [place]  DELIVERY ONLY  2012    Twins    [place] EXPLORATORY OF ABDOMEN      For infertility     Social History     Socioeconomic History    Marital status:    Tobacco Use    Smoking status: Never Smoker    Smokeless tobacco: Never Used   Vaping Use    Vaping Use: Never used   Substance and Sexual Activity    Alcohol use: Yes     Alcohol/week: 2.0 standard drinks     Types: 1 Glasses of wine, 1 Cans of beer per week     Comment: rarely    Drug use: No    Sexual activity: Yes     Partners: Male     Birth control/protection: Surgical     Comment: vasectomy   Social History Narrative     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 Max 80 mg/day.  Do not take with fiorcet 12 Tablet 1    busPIRone (BUSPAR) 10 mg tablet TAKE ONE AND ONE-HALF TABLETS BY MOUTH TWICE A  Tablet 0    valACYclovir (VALTREX) 1 gram tablet TAKE 2 TABLETS BY MOUTH NOW &REPEAT IN 12HRS. *4 TABLETS TOTAL PER OUTBREAK*, 12 Tab 3    butalbital-acetaminophen-caffeine (FIORICET, ESGIC) -40 mg per tablet Take 1.5 tabs po only as needed daily for migraine  Indications: a migraine headache 30 Tab 0    melatonin 5 mg tablet Take 10 mg by mouth nightly.  fluticasone (FLONASE ALLERGY RELIEF) 50 mcg/actuation nasal spray 2 Sprays by Both Nostrils route as needed.  ibuprofen (MOTRIN) 800 mg tablet Take 800 mg by mouth every eight (8) hours as needed for Pain.  trimethoprim-sulfamethoxazole (BACTRIM DS, SEPTRA DS) 160-800 mg per tablet Take 1 Tablet by mouth two (2) times a day. (Patient not taking: Reported on 2/1/2022) 10 Tablet 0    triamcinolone acetonide (KENALOG) 0.1 % ointment Apply  to affected area two (2) times a day. use thin layer BID PRN (Patient not taking: Reported on 9/28/2021) 80 g 0    FINACEA 15 % topical gel  (Patient not taking: Reported on 9/28/2021)  3     No Known Allergies    Review of Systems - General ROS: negative for - chills, fatigue, fever or hot flashes  Cardiovascular ROS: no chest pain or dyspnea on exertion  Respiratory ROS: no cough, shortness of breath, or wheezing    Visit Vitals  /83 (BP 1 Location: Left upper arm, BP Patient Position: Sitting, BP Cuff Size: Adult)   Pulse 75   Temp 97.6 °F (36.4 °C) (Oral)   Resp 16   Ht 5' 9\" (1.753 m)   Wt 139 lb (63 kg)   LMP 01/21/2022 (Approximate)   SpO2 98%   BMI 20.53 kg/m²     General Appearance:  Well developed, well nourished,alert and oriented x 3, and individual in no acute distress. Ears/Nose/Mouth/Throat:   Hearing grossly normal.         Neck: Supple, no lad, no bruits   Chest:   Lungs clear to auscultation bilaterally. Cardiovascular:  Regular rate and rhythm, S1, S2 normal, no murmur. Abdomen:   Soft, non-tender, bowel sounds are active. Extremities: No edema bilaterally.     Skin: Warm and dry, no suspicious lesions                 Prevention    Cardiovascular profile  Family hx  Exercising:  3 times /week    boflex bike but using [employer]  Walking   Feels better after working out    Blood pressure:  Health healthy diet:  Diabetes:  Cholesterol:  Renal function:  Recently vegan      Cancer risk profile  Mammogram no sx 10/2020  , does not desire further children, may   Lung no sx with exercise  Colonoscopy no sx  Skin nonhealing in 2 weeks derm no wwill find antoehr derm  Gyn abnormal bleeding/discharge/abd pain/pressure- dr Hernandez Poor      Thyroid sx      Osteopenia prevention  Calcium 1000mg/day yes  Vitamin D 800iu/day yes    Mental health scale: as above  Depression  Anxiety  Sleep # of hours:  Energy Level:        Immunizations  TDAP 2012  Pneumonia vaccine  Flu vaccine  Shingles vaccine  HPV    Diagnoses and all orders for this visit:    1. Iron deficiency  Patient energy level is good  Labs given in case she feels like she has some fatigue  -     FERRITIN; Future  -     IRON PROFILE; Future  -     CBC W/O DIFF; Future    2. Generalized anxiety disorder  Optimally controlled  Continue BuSpar 20 mg in the morning and 10 in the evening  -     busPIRone (BUSPAR) 10 mg tablet; TAKE ONE AND ONE-HALF TABLETS BY MOUTH TWICE A DAY  -     METABOLIC PANEL, COMPREHENSIVE; Future  -     TSH 3RD GENERATION; Future    3. Undifferentiated connective tissue disease ([address]  Stable  Appears to be doing well off Plaquenil  She will follow up with a new rheumatologist in March. Dr. Karishma Scott    4. Other migraine without status migrainosus, intractable  Overall well controlled and uses medication to match her headache and migraine type symptoms. She can usually tell which medication to use at the start of the symptoms. Menses related  She will follow up with Dr. Juan King  -     butalbital-acetaminophen-caffeine (FIORICET, ESGIC) -40 mg per tablet;  Take 2 tabs po only as needed daily for migraine  Indications: a migraine headache  -     eletriptan (Relpax) 40 mg tablet; may repeat in 2 hours if necessary. Max 80 mg/day. Do not take with fiorcet    5. Cervical cancer screening  She will follow up with Dr. Mai Chaudhary    6. Encounter for hepatitis C screening test for low risk patient  -     HEPATITIS C AB; Future    Other orders  -     valACYclovir (VALTREX) 1 gram tablet; TAKE 2 TABLETS BY MOUTH NOW &REPEAT IN 12HRS. *4 TABLETS TOTAL PER OUTBREAK*,    Gastritis  Patient was seen by GI in summer/fall 2021  She had endoscopy and colonoscopy  She did not have any pathology but was given prescription for Prevacid 30 mg  Discussion with patient if no underlying pathology to consider weaning down. Would like to wean her down to 15 mg and then potentially over to Pepcid if possible. She will work on this. -     lansoprazole (PREVACID) 15 mg capsule; Take 1 Capsule by mouth Daily (before breakfast). Intent to wean off      On this date 02/01/22  I have spent 39 minutes reviewing previous notes, test results and face to face with the patient discussing the diagnosis and importance of compliance with the treatment plan as well as documenting on the day of the visit.

## 2022-03-30 ENCOUNTER — OFFICE VISIT (OUTPATIENT)
Dept: RHEUMATOLOGY | Age: 42
End: 2022-03-30
Payer: COMMERCIAL

## 2022-03-30 VITALS
RESPIRATION RATE: 16 BRPM | WEIGHT: 136.6 LBS | DIASTOLIC BLOOD PRESSURE: 76 MMHG | HEIGHT: 69 IN | OXYGEN SATURATION: 96 % | BODY MASS INDEX: 20.23 KG/M2 | SYSTOLIC BLOOD PRESSURE: 116 MMHG | TEMPERATURE: 98.1 F | HEART RATE: 86 BPM

## 2022-03-30 DIAGNOSIS — M25.50 HYPERMOBILITY ARTHRALGIA: Primary | ICD-10-CM

## 2022-03-30 DIAGNOSIS — L71.9 ROSACEA: ICD-10-CM

## 2022-03-30 PROCEDURE — 99215 OFFICE O/P EST HI 40 MIN: CPT | Performed by: INTERNAL MEDICINE

## 2022-03-30 RX ORDER — MELOXICAM 15 MG/1
15 TABLET ORAL DAILY
Qty: 30 TABLET | Refills: 3 | Status: SHIPPED | OUTPATIENT
Start: 2022-03-30 | End: 2022-05-11 | Stop reason: SDUPTHER

## 2022-03-30 NOTE — PROGRESS NOTES
Chief Complaint   Patient presents with    Other     connective tissue diseae     1. Have you been to the ER, urgent care clinic since your last visit? Hospitalized since your last visit? No    2. Have you seen or consulted any other health care providers outside of the [address] [place] [address] since your last visit? Include any pap smears or colon screening.  No

## 2022-03-30 NOTE — PROGRESS NOTES
REASON FOR VISIT    This is a follow-up visit for Ms. Markell Hopson for possible:    ICD-10-CM   1. Undifferentiated connective tissue disease (HCC)  M35.9       Inflammatory arthritis phenotype includes:  Anti-CCP positive: no  Rheumatoid factor positive: no  Erosive disease: N/A  Extra-articular manifestations include: benign hypermobility syndrome    Immunosuppression Screening:  Quantiferon TB: Not performed  PPD:  Not performed  Hepatitis B: Not performed  Hepatitis C: Not performed    Therapy History includes:  Current DMARD therapy include: hydroxychloroquine 400 mg daily (9/30/2020 to present)  Prior DMARD therapy include: none  Discontinued DMARDs because of inefficacy: None  Discontinued DMARDs because of side effects: None    HISTORY OF PRESENT ILLNESS    Ms. Markell Hopson returns for a follow-up. Former Dr. Dinora Cunningham patient. Pt was on Plaquenil (hydroxychloroquine) in the past. She has not been on it for the past few months. She was seeing Dr. Dinora Cunningham for wrist pain. She no longer has wrist pain but currently her fingers hurt. She thinks there is mild swelling in her fingers. They feel stiff in the morning and take a few minutes to warm up. Pt notes of breaking her R middle finger a few years ago after jamming her finger in a fall, and that is the most painful finger. The finger pain is intermittent and is unsure of what sets it off. She took tylenol yesterday but does not normally due to the fact that the pain only lasts a few seconds. Pt took ibuprofen for months for her wrist with little relief. Pt reports of R lateral hip pain for the past few weeks. She denies any hip pain currently in office. Pain is worse when she sits for awhile and stand up. Denies any trauma or strain. She states she had similar R hip pain a few years ago which resolved on its own. Pt reports of HA and states that it is controlled. She notes of only a few days of HA per month. She is not taking any medication.      Denies hx of hip and shoulder dislocation when she was younger. Denies Fmhx of autoimmune disorders. She has rosacea since her 20's, which waxes and wanes. She has been intolerant of doxycycline in the past but generally feels this is controlled with topical creams. Denies diarrhea, constipation, any significant hair changes    Hx of reflux. Describes it as burning. She is unsure of what sets it off. Mostly controlled with lansoprazole. Micheal Milian REVIEW OF SYSTEMS    A comprehensive review of systems was performed and pertinent results are documented in the HPI, review of systems is otherwise non-contributory. PAST MEDICAL HISTORY    She has a past medical history of Abnormal Pap smear, Headache, Herpes simplex without mention of complication, Ill-defined condition, Infertility, and Unspecified breast disorder. FAMILY HISTORY    Her family history includes Asthma in her brother; Elevated Lipids in her maternal grandfather and paternal grandmother; Hypertension in her father and paternal grandmother; No Known Problems in her mother. SOCIAL HISTORY    She reports that she has never smoked. She has never used smokeless tobacco. She reports current alcohol use of about 2.0 standard drinks of alcohol per week. She reports that she does not use drugs. 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  busPIRone (BUSPAR) 10 mg tablet TAKE ONE AND ONE-HALF TABLETS BY MOUTH TWICE A DAY    valACYclovir (VALTREX) 1 gram tablet TAKE 2 TABLETS BY MOUTH NOW &REPEAT IN 12HRS. *4 TABLETS TOTAL PER OUTBREAK*,    butalbital-acetaminophen-caffeine (FIORICET, ESGIC) -40 mg per tablet Take 2 tabs po only as needed daily for migraine  Indications: a migraine headache    eletriptan (Relpax) 40 mg tablet may repeat in 2 hours if necessary. Max 80 mg/day. Do not take with fiorcet    lansoprazole (PREVACID) 15 mg capsule Take 1 Capsule by mouth Daily (before breakfast). Intent to wean off    melatonin 5 mg tablet Take 10 mg by mouth nightly.  fluticasone (FLONASE ALLERGY RELIEF) 50 mcg/actuation nasal spray 2 Sprays by Both Nostrils route as needed.  ibuprofen (MOTRIN) 800 mg tablet Take 800 mg by mouth every eight (8) hours as needed for Pain. No current facility-administered medications for this visit. ALLERGIES    No Known Allergies    PHYSICAL EXAMINATION    Visit Vitals  /76 (BP 1 Location: Left upper arm, BP Patient Position: Sitting)   Pulse 86   Temp 98.1 °F (36.7 °C) (Oral)   Resp 16   Ht 5' 9\" (1.753 m)   Wt 136 lb 9.6 oz (62 kg)   SpO2 96%   BMI 20.17 kg/m²     Body mass index is 20.17 kg/m². General:  The patient is well developed, well nourished, alert, and in no apparent distress. Eyes: Sclera are anicteric. No conjunctival injection. HEENT:  Oropharynx is clear. No oral ulcers. Adequate salivary pooling. No cervical or supraclavicular lymphadenopathy. Small torus palatinus. Lungs:  Clear to auscultation bilaterally, without wheeze or stridor. Normal respiratory effort. Cor:  Regular rate and rhythm. No murmur rub or gallop. Abdomen: Soft, non-tender, without hepatomegaly or masses. Extremities: No calf tenderness or edema. Warm and well perfused. Skin:  Mild facial telangiectasias and erythema consistent with rosacea. Neuro: Nonfocal  Musculoskeletal:   A comprehensive musculoskeletal exam was performed for all joints of each upper and lower extremity and assessed for swelling, tenderness and range of motion. Results are documented as below: Beighton score 6/9. No synovitis of the hands. No evidence of synovitis in the small joints of the wrists, shoulders, elbows, hips, knees or ankles.        DATA REVIEW    Laboratory   10/8/21: Cr 0.55, LFT WNL, WBC 5.1, HGB 14.2, Plt 188,   9/3/2019: CRP <1 mg/L, ESR 2    Recent laboratory results were reviewed, summarized, and discussed with the patient. Imaging    Musculoskeletal Ultrasound    None    Radiographs    None    CT Imaging    None    MR Imaging    None    DXA     None    ASSESSMENT AND PLAN    This is a follow-up visit for Ms. Clement Reynolds with history of GERD and rosacea, for mixed-character arthralgias on a background of generalized hypermobility. She does note a remote h/o symptomatic orthostasis previously treated with beta blockers, which has improved with age. She fortunately does not have evidence of more generalized complications such as hernias, intusussception, dental crowding, piezogenic nodules, mast cell disorders, or cardiac valvular disease to suggest a more globalized Erlinda Danlos type 3. Provided reassurance that suspicion is low for inflammatory arthritis. Prescribing a trial of meloxicam for hypermobility-related arthralgias. Would continue to avoid immunomodulators indefinitely barring new synovitis, rashes, or other inflammatory manifestations. Will follow up on an as-needed basis only going forward. Explained that I do not believe she has undifferentiated connective tissue disease. 1. Hypermobility arthralgia  - meloxicam (MOBIC) 15 mg tablet; Take 1 Tablet by mouth daily for 30 days. Dispense: 30 Tablet; Refill: 3  -Reviewed avoidance of heavy axial loading, over stretching    2. Rosacea  -Cont topicals      Patient Instructions   1. Prescribed meloxicam 15 mg every day with breakfast for a month and then as needed after that. Reach out to Dr. Willem Murray to prescribe this if you want to continue use. 2.  Send me a message if any issues arrise- new rashes, joint swelling, worsening fatigue. 3. Continue taking at least 2000 iu of Vitamin D daily for history of Vitamin D deficiency. 4. Follow up as needed for new concerns.          TODAY'S ORDERS    Orders Placed This Encounter    meloxicam (MOBIC) 15 mg tablet     No future appointments.   Face to face time: 35 minutes  Note preparation and records review day of service: 15 minutes  Total provider time day of service: 50 minutes    This was scribed by Elisabeth Boone in the presence of Dr. Leonardo Ramey MD    Adult Rheumatology   [address] [facility], [address] [facility]   Phone [phone]  Fax [phone]

## 2022-03-30 NOTE — LETTER
3/31/2022    Patient: Yoel Romano   YOB: 1980   Date of Visit: 3/30/2022     Perez Fontaine MD  [place] [address]  [address]  [address] [facility]  Via In Basket    Dear Perez Fontaine MD,    We recently saw Ms. Bree Cotton in the [facility] for evaluation. My notes for this consultation are attached. If you have questions, please do not hesitate to call me. I look forward to following your patient along with you.       Sincerely,    Trish Lozano MD [facility]  Cell: [phone]

## 2022-03-30 NOTE — PATIENT INSTRUCTIONS
1. Prescribed meloxicam 15 mg every day with breakfast for a month and then as needed after that. Reach out to Dr. Mely Melo to prescribe this if you want to continue use. 2.  Send me a message if any issues arrise- new rashes, joint swelling, worsening fatigue. 3. Continue taking at least 2000 iu of Vitamin D daily for history of Vitamin D deficiency. 4. Follow up as needed for new concerns.

## 2022-03-31 NOTE — PROGRESS NOTES
REASON FOR VISIT    This is a follow-up visit for Ms. Mami Alicia for possible:    ICD-10-CM   1. Undifferentiated connective tissue disease (HCC)  M35.9       Inflammatory arthritis phenotype includes:  Anti-CCP positive: no  Rheumatoid factor positive: no  Erosive disease: N/A  Extra-articular manifestations include: benign hypermobility syndrome    Immunosuppression Screening:  Quantiferon TB: Not performed  PPD:  Not performed  Hepatitis B: Not performed  Hepatitis C: Not performed    Therapy History includes:  Current DMARD therapy include: hydroxychloroquine 400 mg daily (9/30/2020 to present)  Prior DMARD therapy include: none  Discontinued DMARDs because of inefficacy: None  Discontinued DMARDs because of side effects: None    HISTORY OF PRESENT ILLNESS    Ms. Mami Alicia returns for a follow-up. Former Dr. Hi Monsivais patient. Pt was on Plaquenil (hydroxychloroquine) in the past. She has not been on it for the past few months. She was seeing Dr. Hi Monsivais for wrist pain. She no longer has wrist pain but currently her fingers hurt. She thinks there is mild swelling in her fingers. They feel stiff in the morning and take a few minutes to warm up. Pt notes of breaking her R middle finger a few years ago after jamming her finger in a fall, and that is the most painful finger. The finger pain is intermittent and is unsure of what sets it off. She took tylenol yesterday but does not normally due to the fact that the pain only lasts a few seconds. Pt took ibuprofen for months for her wrist with little relief. Pt reports of R lateral hip pain for the past few weeks. She denies any hip pain currently in office. Pain is worse when she sits for awhile and stand up. Denies any trauma or strain. She states she had similar R hip pain a few years ago which resolved on its own. Pt reports of HA and states that it is controlled. She notes of only a few days of HA per month. She is not taking any medication.      Denies hx of hip and shoulder dislocation when she was younger. Denies Fmhx of autoimmune disorders. She has rosacea since her 20's, which waxes and wanes. She has been intolerant of doxycycline in the past but generally feels this is controlled with topical creams. Denies diarrhea, constipation, any significant hair changes    Hx of reflux. Describes it as burning. She is unsure of what sets it off. Mostly controlled with lansoprazole. Gómez Cooper REVIEW OF SYSTEMS    A comprehensive review of systems was performed and pertinent results are documented in the HPI, review of systems is otherwise non-contributory. PAST MEDICAL HISTORY    She has a past medical history of Abnormal Pap smear, Headache, Herpes simplex without mention of complication, Ill-defined condition, Infertility, and Unspecified breast disorder. FAMILY HISTORY    Her family history includes Asthma in her brother; Elevated Lipids in her maternal grandfather and paternal grandmother; Hypertension in her father and paternal grandmother; No Known Problems in her mother. SOCIAL HISTORY    She reports that she has never smoked. She has never used smokeless tobacco. She reports current alcohol use of about 2.0 standard drinks of alcohol per week. She reports that she does not use drugs. 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  busPIRone (BUSPAR) 10 mg tablet TAKE ONE AND ONE-HALF TABLETS BY MOUTH TWICE A DAY    valACYclovir (VALTREX) 1 gram tablet TAKE 2 TABLETS BY MOUTH NOW &REPEAT IN 12HRS. *4 TABLETS TOTAL PER OUTBREAK*,    butalbital-acetaminophen-caffeine (FIORICET, ESGIC) -40 mg per tablet Take 2 tabs po only as needed daily for migraine  Indications: a migraine headache    eletriptan (Relpax) 40 mg tablet may repeat in 2 hours if necessary. Max 80 mg/day. Do not take with fiorcet    lansoprazole (PREVACID) 15 mg capsule Take 1 Capsule by mouth Daily (before breakfast). Intent to wean off    melatonin 5 mg tablet Take 10 mg by mouth nightly.  fluticasone (FLONASE ALLERGY RELIEF) 50 mcg/actuation nasal spray 2 Sprays by Both Nostrils route as needed.  ibuprofen (MOTRIN) 800 mg tablet Take 800 mg by mouth every eight (8) hours as needed for Pain. No current facility-administered medications for this visit. ALLERGIES    No Known Allergies    PHYSICAL EXAMINATION    Visit Vitals  /76 (BP 1 Location: Left upper arm, BP Patient Position: Sitting)   Pulse 86   Temp 98.1 °F (36.7 °C) (Oral)   Resp 16   Ht 5' 9\" (1.753 m)   Wt 136 lb 9.6 oz (62 kg)   SpO2 96%   BMI 20.17 kg/m²     Body mass index is 20.17 kg/m². General:  The patient is well developed, well nourished, alert, and in no apparent distress. Eyes: Sclera are anicteric. No conjunctival injection. HEENT:  Oropharynx is clear. No oral ulcers. Adequate salivary pooling. No cervical or supraclavicular lymphadenopathy. Small torus palatinus. Lungs:  Clear to auscultation bilaterally, without wheeze or stridor. Normal respiratory effort. Cor:  Regular rate and rhythm. No murmur rub or gallop. Abdomen: Soft, non-tender, without hepatomegaly or masses. Extremities: No calf tenderness or edema. Warm and well perfused. Skin:  Mild facial telangiectasias and erythema consistent with rosacea. Neuro: Nonfocal  Musculoskeletal:   A comprehensive musculoskeletal exam was performed for all joints of each upper and lower extremity and assessed for swelling, tenderness and range of motion. Results are documented as below: Beighton score 6/9. No synovitis of the hands. No evidence of synovitis in the small joints of the wrists, shoulders, elbows, hips, knees or ankles.        DATA REVIEW    Laboratory   10/8/21: Cr 0.55, LFT WNL, WBC 5.1, HGB 14.2, Plt 188,   9/3/2019: CRP <1 mg/L, ESR 2    Recent laboratory results were reviewed, summarized, and discussed with the patient. Imaging    Musculoskeletal Ultrasound    None    Radiographs    None    CT Imaging    None    MR Imaging    None    DXA     None    ASSESSMENT AND PLAN    This is a follow-up visit for Ms. Charisma Valdez with history of GERD and rosacea, for mixed-character arthralgias on a background of generalized hypermobility. She does note a remote h/o symptomatic orthostasis previously treated with beta blockers, which has improved with age. She fortunately does not have evidence of more generalized complications such as hernias, intusussception, dental crowding, piezogenic nodules, mast cell disorders, or cardiac valvular disease to suggest a more globalized Erlinda Danlos type 3. Provided reassurance that suspicion is low for inflammatory arthritis. Prescribing a trial of meloxicam for hypermobility-related arthralgias. Would continue to avoid immunomodulators indefinitely barring new synovitis, rashes, or other inflammatory manifestations. Will follow up on an as-needed basis only going forward. Explained that I do not believe she has undifferentiated connective tissue disease. 1. Hypermobility arthralgia  - meloxicam (MOBIC) 15 mg tablet; Take 1 Tablet by mouth daily for 30 days. Dispense: 30 Tablet; Refill: 3  -Reviewed avoidance of heavy axial loading, over stretching    2. Rosacea  -Cont topicals      Patient Instructions   1. Prescribed meloxicam 15 mg every day with breakfast for a month and then as needed after that. Reach out to Dr. Ian Ignacio to prescribe this if you want to continue use. 2.  Send me a message if any issues arrise- new rashes, joint swelling, worsening fatigue. 3. Continue taking at least 2000 iu of Vitamin D daily for history of Vitamin D deficiency. 4. Follow up as needed for new concerns.          TODAY'S ORDERS    Orders Placed This Encounter    meloxicam (MOBIC) 15 mg tablet     No future appointments.   Face to face time: 35 minutes  Note preparation and records review day of service: 15 minutes  Total provider time day of service: 50 minutes    This was scribed by Julio Correa in the presence of Dr. Lisy Rodríguez MD    Adult Rheumatology   [address] [facility], [facility], [address] [place] [address]   Phone [phone]  Fax [phone]

## 2022-05-11 DIAGNOSIS — M25.50 HYPERMOBILITY ARTHRALGIA: ICD-10-CM

## 2022-05-11 RX ORDER — MELOXICAM 15 MG/1
15 TABLET ORAL DAILY
Qty: 90 TABLET | Refills: 0 | Status: SHIPPED | OUTPATIENT
Start: 2022-05-11 | End: 2022-08-09

## 2022-05-11 RX ORDER — MELOXICAM 15 MG/1
15 TABLET ORAL DAILY
Qty: 90 TABLET | Refills: 0 | Status: SHIPPED | OUTPATIENT
Start: 2022-05-11 | End: 2022-05-11

## 2022-06-13 ENCOUNTER — PATIENT MESSAGE (OUTPATIENT)
Dept: RHEUMATOLOGY | Age: 42
End: 2022-06-13

## 2022-06-20 RX ORDER — PREDNISONE 5 MG/1
TABLET ORAL
Qty: 30 TABLET | Refills: 1 | Status: SHIPPED | OUTPATIENT
Start: 2022-06-20

## 2022-06-20 NOTE — TELEPHONE ENCOUNTER
From: Kena Ramírez  To: Rebel Ferreira MD  Sent: 6/13/2022 11:31 AM EDT  Subject: Hands    I've had a change to the pain in my hands. I'm having substantial pain in my thumbs (thumbs have never been a problem) and more wrist pain (wrists were a problem awhile back but haven't been problamatic for months). My hands hurt more than ever, particularly my right thumb. I am taking the meloxicam daily, not sure if its helping but I do know its not sufficient. I had taken plaquenil in the past, but got off to see how I would do, and I did fine. 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 Please let me know your thoughts.

## 2022-07-05 NOTE — TELEPHONE ENCOUNTER
Called pt to f/u response to prednisone and interval changes. Per  initial response to 40mg but pain returned on lower doses. Pt not available to talk.   Encouraged her to call to update us and schedule a followup to figure out underlying cause for pain and longer-term solution

## 2022-08-05 ENCOUNTER — TELEPHONE (OUTPATIENT)
Dept: RHEUMATOLOGY | Age: 42
End: 2022-08-05

## 2022-08-05 NOTE — TELEPHONE ENCOUNTER
Patient called and advised she is having really bad hand pain and wants to know is there is any thing else she can try, would like a call at [phone]

## 2022-08-08 NOTE — TELEPHONE ENCOUNTER
Spoke with pt and made her an appt on Wed per Dr. Sandor Lloyd so that he is able to evaluate her hand pain.

## 2022-08-10 ENCOUNTER — OFFICE VISIT (OUTPATIENT)
Dept: RHEUMATOLOGY | Age: 42
End: 2022-08-10
Payer: COMMERCIAL

## 2022-08-10 VITALS
HEART RATE: 85 BPM | WEIGHT: 138 LBS | RESPIRATION RATE: 18 BRPM | BODY MASS INDEX: 20.38 KG/M2 | DIASTOLIC BLOOD PRESSURE: 84 MMHG | TEMPERATURE: 97.9 F | SYSTOLIC BLOOD PRESSURE: 120 MMHG

## 2022-08-10 DIAGNOSIS — M25.50 HYPERMOBILITY ARTHRALGIA: Primary | ICD-10-CM

## 2022-08-10 DIAGNOSIS — M19.041 PRIMARY OSTEOARTHRITIS OF BOTH HANDS: ICD-10-CM

## 2022-08-10 DIAGNOSIS — M19.042 PRIMARY OSTEOARTHRITIS OF BOTH HANDS: ICD-10-CM

## 2022-08-10 PROCEDURE — 99214 OFFICE O/P EST MOD 30 MIN: CPT | Performed by: INTERNAL MEDICINE

## 2022-08-10 NOTE — PATIENT INSTRUCTIONS
1) Try to use arthritis gloves for your joint pain. You can order these on [employer] 2) Use diclofenac gel on your hands for pain relief as needed. 3) You can take up to 1,000mg of Tylenol 3 times a day for joint pain as needed. You can take these at the same time as Meloxicam.     4) let me know if you want me to send you to occupational therapy for your hands. 5) Try hot wax treatments for your hand. You can do these in a nail salon or buy a kit from [facility]. 6) Follow up in 3-4 months. At this point if you are still having the pain we can discuss updating labs and an MRI.

## 2022-08-10 NOTE — PROGRESS NOTES
REASON FOR VISIT    This is a follow-up visit for Ms. Magana Gathers for possible:    ICD-10-CM   1. Undifferentiated connective tissue disease (HCC)  M35.9       Inflammatory arthritis phenotype includes:  Anti-CCP positive: no  Rheumatoid factor positive: no  Erosive disease: N/A  Extra-articular manifestations include: benign hypermobility syndrome    Immunosuppression Screening:  Quantiferon TB: Not performed  PPD:  Not performed  Hepatitis B: Not performed  Hepatitis C: Not performed    Therapy History includes:  Current DMARD therapy include: hydroxychloroquine 400 mg daily (9/30/2020 to present)  Prior DMARD therapy include: none  Discontinued DMARDs because of inefficacy: None  Discontinued DMARDs because of side effects: None    HISTORY OF PRESENT ILLNESS    Ms. Magana Gathers returns for a follow-up. Pt reports that her hand pain is worsening, specifically in her thumbs. She describes her hand pain as a constant dull pain, and when she grabs something her pain is more acute. She notes that she wakes up with pain, but most of the pain fades throughout the day. Pt tried to use ibuprofen for her pain. She is currently using Meloxicam. She also reports that she took Plaquenil for a year. She thought the Plaquenil was working at the time but did not feel a difference when she came off of it. Worsening of hand pain correlates with increased use of her hands for a foster baby who recently came into her care. She has the baby for another 2 months. Pt notes that the joint pain in her hands is now bad enough that it is affecting more aspects of her life. She says she can no longer carry as much and can no longer play tennis. Pt denies recent problems with dislocations. Pt reports that her rosacea has been unchanged. She had stopped the Plaquenil (hydroxychloroquine) so she could move forward with laser treatment, but has not yet elected to pursue the treatment to date.       REVIEW OF SYSTEMS    A comprehensive review of systems was performed and pertinent results are documented in the HPI, review of systems is otherwise non-contributory. PAST MEDICAL HISTORY    She has a past medical history of Abnormal Pap smear, Headache, Herpes simplex without mention of complication, Ill-defined condition, Infertility, and Unspecified breast disorder. FAMILY HISTORY    Her family history includes Asthma in her brother; Elevated Lipids in her maternal grandfather and paternal grandmother; Hypertension in her father and paternal grandmother; No Known Problems in her mother. SOCIAL HISTORY    She reports that she has never smoked. She has never used smokeless tobacco. She reports current alcohol use of about 2.0 standard drinks per week. She reports that she does not use drugs. 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 *4 TABLETS TOTAL PER OUTBREAK*,    butalbital-acetaminophen-caffeine (FIORICET, ESGIC) -40 mg per tablet Take 2 tabs po only as needed daily for migraine  Indications: a migraine headache    eletriptan (Relpax) 40 mg tablet may repeat in 2 hours if necessary. Max 80 mg/day. Do not take with fiorcet    lansoprazole (PREVACID) 15 mg capsule Take 1 Capsule by mouth Daily (before breakfast). Intent to wean off    melatonin 5 mg tablet Take 10 mg by mouth nightly. fluticasone (FLONASE ALLERGY RELIEF) 50 mcg/actuation nasal spray 2 Sprays by Both Nostrils route as needed. ibuprofen (MOTRIN) 800 mg tablet Take 800 mg by mouth every eight (8) hours as needed for Pain. No current facility-administered medications for this visit. ALLERGIES    No Known Allergies    PHYSICAL EXAMINATION    Visit Vitals  /84   Pulse 85   Temp 97.9 °F (36.6 °C)   Resp 18   Wt 138 lb (62.6 kg)   BMI 20.38 kg/m²       Body mass index is 20.38 kg/m². General:  The patient is well developed, well nourished, alert, and in no apparent distress. Carrying foster baby today. Eyes: Sclera are anicteric. No conjunctival injection. HEENT:  Oropharynx is clear. No oral ulcers. Adequate salivary pooling. No cervical or supraclavicular lymphadenopathy. Previously small torus palatinus. Lungs:  Normal respiratory effort. Cor:  Regular rate and rhythm. Abdomen: Soft, non-tender, without hepatomegaly or masses. Extremities: No calf tenderness or edema. Warm and well perfused. Skin:  Unchanged mild facial telangiectasias and erythema consistent with rosacea. Neuro: Nonfocal, no foot or wrist drop, normal unassisted gait. Musculoskeletal:   A comprehensive musculoskeletal exam was performed for all joints of each upper and lower extremity and assessed for swelling, tenderness and range of motion. Results are documented as below:   Negative Finkelstein maneuver, no reproducible hand tenderness at the moment  Early bilateral CMC squaring  Hypermobility through MCPs, elbow  Beighton score previously 6/9. No synovitis of the hands. No evidence of synovitis in the small joints of the wrists, shoulders, elbows, hips, knees or ankles. DATA REVIEW    Laboratory     10/8/21: Cr 0.55, LFT WNL, WBC 5.1, HGB 14.2, Plt 188,   9/3/2019: CRP <1 mg/L, ESR 2, direct REJI negative; RF neg, CCP neg,     Recent laboratory results were reviewed, summarized, and discussed with the patient. Imaging    Musculoskeletal Ultrasound    None    Radiographs    1/11/22 XR hands:  Normal bilateral hand views. No fracture or evidence of inflammatory arthritis.       CT Imaging    None    MR Imaging    None    DXA     None    ASSESSMENT AND PLAN    This is a follow-up visit for Ms. Monika Couch with history of GERD and rosacea, for mixed-character arthralgias on a background of generalized hypermobility. She has had increased hand pain with new caregiver responsibilities for her Inocente Latoya child, still without evidence of active joint inflammation on exam. Reviewed conservative management with compression gloves, topical diclofenac, and hot wax. Will reevaluate symptoms a month after she has completed her fostering responsibilities, and can pursue soft tissue imaging at that time if still experiencing limiting pain. 1. Hypermobility arthralgia  -Cont meloxicam 15mg daily, can add Tylenol up to 1000mg TID as needed for breakthrough  -Reviewed hot wax treatments for hands  -Compression gloves with activity    2. Primary osteoarthritis of both hands  -Same treatment as above      Patient Instructions   1) Try to use arthritis gloves for your joint pain. You can order these on [address]. 2) Use diclofenac gel on your hands for pain relief as needed. 3) You can take up to 1,000mg of Tylenol 3 times a day for joint pain as needed. You can take these at the same time as Meloxicam.     4) let me know if you want me to send you to occupational therapy for your hands. 5) Try hot wax treatments for your hand. You can do these in a nail salon or buy a kit from [facility]. 6) Follow up in 3-4 months. At this point if you are still having the pain we can discuss updating labs and an MRI. TODAY'S ORDERS    No orders of the defined types were placed in this encounter.     Future Appointments   Date Time Provider Cooper Sia   8/10/2022  9:20 AM Hal Sarabia MD [facility]     Face to face time: 18 minutes  Note preparation and records review day of service: 15 minutes  Total provider time day of service: 33 minutes    This was scribed by Flaquita Haider in the presence of Dr. Mary Ellen Wiley MD    Adult Rheumatology   [address] [facility] [address], [place], [address] [facility]   Phone [phone]  Fax [phone]

## 2022-08-25 RX ORDER — MELOXICAM 15 MG/1
15 TABLET ORAL DAILY
Qty: 30 TABLET | Refills: 3 | Status: SHIPPED | OUTPATIENT
Start: 2022-08-25

## 2022-08-27 DIAGNOSIS — G43.819 OTHER MIGRAINE WITHOUT STATUS MIGRAINOSUS, INTRACTABLE: ICD-10-CM

## 2022-08-28 RX ORDER — ELETRIPTAN HYDROBROMIDE 40 MG/1
TABLET, FILM COATED ORAL
Qty: 12 TABLET | Refills: 1 | Status: SHIPPED | OUTPATIENT
Start: 2022-08-28

## 2022-08-31 ENCOUNTER — PATIENT MESSAGE (OUTPATIENT)
Dept: RHEUMATOLOGY | Age: 42
End: 2022-08-31

## 2022-09-02 NOTE — TELEPHONE ENCOUNTER
From: Dora Perez  To: Nabor Vang MD  Sent: 8/31/2022 7:01 PM EDT  Subject: Steroid Shot    Any chance I can get in this week for a steroid shot in my right thumb?

## 2022-09-02 NOTE — TELEPHONE ENCOUNTER
Spoke with patient, appt scheduled for Tuesday at 8:00                                                8  ;

## 2022-09-06 ENCOUNTER — OFFICE VISIT (OUTPATIENT)
Dept: RHEUMATOLOGY | Age: 42
End: 2022-09-06
Payer: COMMERCIAL

## 2022-09-06 VITALS
WEIGHT: 134.8 LBS | SYSTOLIC BLOOD PRESSURE: 127 MMHG | HEART RATE: 81 BPM | DIASTOLIC BLOOD PRESSURE: 75 MMHG | RESPIRATION RATE: 18 BRPM | BODY MASS INDEX: 19.96 KG/M2 | HEIGHT: 69 IN | TEMPERATURE: 97.7 F | OXYGEN SATURATION: 99 %

## 2022-09-06 DIAGNOSIS — M25.50 HYPERMOBILITY ARTHRALGIA: ICD-10-CM

## 2022-09-06 DIAGNOSIS — M65.4 DE QUERVAIN'S TENOSYNOVITIS, RIGHT: ICD-10-CM

## 2022-09-06 DIAGNOSIS — M18.0 OSTEOARTHRITIS OF CARPOMETACARPAL (CMC) JOINT OF BOTH THUMBS: Primary | ICD-10-CM

## 2022-09-06 PROCEDURE — 99213 OFFICE O/P EST LOW 20 MIN: CPT | Performed by: INTERNAL MEDICINE

## 2022-09-06 PROCEDURE — 20605 DRAIN/INJ JOINT/BURSA W/O US: CPT | Performed by: INTERNAL MEDICINE

## 2022-09-06 RX ORDER — TRIAMCINOLONE ACETONIDE 40 MG/ML
20 INJECTION, SUSPENSION INTRA-ARTICULAR; INTRAMUSCULAR ONCE
Qty: 1 ML | Refills: 0
Start: 2022-09-06 | End: 2022-09-06

## 2022-09-06 RX ORDER — LIDOCAINE HYDROCHLORIDE 10 MG/ML
1 INJECTION INFILTRATION; PERINEURAL ONCE
Qty: 1 ML | Refills: 0
Start: 2022-09-06 | End: 2022-09-06

## 2022-09-06 NOTE — PROGRESS NOTES
Chief Complaint   Patient presents with    Joint Pain       wrist     1. Have you been to the ER, urgent care clinic since your last visit? Hospitalized since your last visit? No    2. Have you seen or consulted any other health care providers outside of the [address] [place] [address] since your last visit? Include any pap smears or colon screening.  No

## 2022-09-06 NOTE — PROGRESS NOTES
REASON FOR VISIT    This is an urgent follow-up visit for Ms. Brooke Finn for possible thumb injection. Inflammatory arthritis phenotype includes:  Anti-CCP positive: no  Rheumatoid factor positive: no  Erosive disease: N/A  Extra-articular manifestations include: benign hypermobility syndrome    Immunosuppression Screening:  Quantiferon TB: Not performed  PPD:  Not performed  Hepatitis B: Not performed  Hepatitis C: Not performed    Therapy History includes:  Current DMARD therapy include: hydroxychloroquine 400 mg daily (9/30/2020 to present)  Prior DMARD therapy include: none  Discontinued DMARDs because of inefficacy: None  Discontinued DMARDs because of side effects: None    HISTORY OF PRESENT ILLNESS    Ms. Brooke Finn returns for an expedited follow-up. Pt called last week to get in for a CMC injection. She has one more month with her current foster baby, whom she works with closely. Pain is worse with driving and with use as well. She says that over the weekend her wrists started to hurt instead of her thumb. Pt takes Tylenol daily and she takes Meloxicam as needed. She notes that she can not tell if Voltaren gel works for her or not but she still uses it. She also wears arthritis gloves sometimes which she says helps. REVIEW OF SYSTEMS    A comprehensive review of systems was performed and pertinent results are documented in the HPI, review of systems is otherwise non-contributory. PAST MEDICAL HISTORY    She has a past medical history of Abnormal Pap smear, Headache, Herpes simplex without mention of complication, Ill-defined condition, Infertility, and Unspecified breast disorder. FAMILY HISTORY    Her family history includes Asthma in her brother; Elevated Lipids in her maternal grandfather and paternal grandmother; Hypertension in her father and paternal grandmother; No Known Problems in her mother. SOCIAL HISTORY    She reports that she has never smoked.  She has never used smokeless tobacco. She reports current alcohol use of about 2.0 standard drinks per week. She reports that she does not use drugs. 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 MAY REPEAT IN 2 HOURS IF NECESSARY. MAX 80MG/DAY. DO NOT TAKE WITH FIORICET    meloxicam (MOBIC) 15 mg tablet Take 1 Tablet by mouth daily. predniSONE (DELTASONE) 5 mg tablet 20mg x 3 days, 15mg x 3 days, 10mg x 3 days, 5mg x 3 days (Patient not taking: Reported on 8/10/2022)    busPIRone (BUSPAR) 10 mg tablet TAKE ONE AND ONE-HALF TABLETS BY MOUTH TWICE A DAY    valACYclovir (VALTREX) 1 gram tablet TAKE 2 TABLETS BY MOUTH NOW &REPEAT IN 12HRS. *4 TABLETS TOTAL PER OUTBREAK*,    butalbital-acetaminophen-caffeine (FIORICET, ESGIC) -40 mg per tablet Take 2 tabs po only as needed daily for migraine  Indications: a migraine headache    lansoprazole (PREVACID) 15 mg capsule Take 1 Capsule by mouth Daily (before breakfast). Intent to wean off    melatonin 5 mg tablet Take 10 mg by mouth nightly. fluticasone propionate (FLONASE) 50 mcg/actuation nasal spray 2 Sprays by Both Nostrils route as needed. ibuprofen (MOTRIN) 800 mg tablet Take 800 mg by mouth every eight (8) hours as needed for Pain. No current facility-administered medications for this visit. ALLERGIES    No Known Allergies    PHYSICAL EXAMINATION    Visit Vitals  /75 (BP 1 Location: Left upper arm, BP Patient Position: Sitting)   Pulse 81   Temp 97.7 °F (36.5 °C) (Oral)   Resp 18   Ht 5' 9\" (1.753 m)   Wt 134 lb 12.8 oz (61.1 kg)   SpO2 99%   BMI 19.91 kg/m²         Body mass index is 19.91 kg/m². General:  The patient is well developed, well nourished, alert, and in no apparent distress. Carrying foster baby today. Eyes: Sclera are anicteric. No conjunctival injection. HEENT:  Oropharynx is clear. No oral ulcers. Adequate salivary pooling. No cervical or supraclavicular lymphadenopathy. Previously small torus palatinus. Lungs:  Normal respiratory effort. Cor:  Regular rate and rhythm. Abdomen: Soft, non-tender, without hepatomegaly or masses. Extremities: No calf tenderness or edema. Warm and well perfused. Skin:  Unchanged mild facial telangiectasias and erythema consistent with rosacea. Neuro: Nonfocal, no foot or wrist drop, normal unassisted gait. Musculoskeletal:   A comprehensive musculoskeletal exam was performed for all joints of each upper and lower extremity and assessed for swelling, tenderness and range of motion. Results are documented as below: Today mildly +Finkelstein maneuver on right. Early bilateral CMC squaring with associated tenderness. Hypermobility through MCPs, elbow  Beighton score previously 6/9, not reexamined today. No synovitis of the hands. No evidence of synovitis in the small joints of the wrists, shoulders, elbows, hips, knees or ankles. DATA REVIEW    Laboratory     10/8/21: Cr 0.55, LFT WNL, WBC 5.1, HGB 14.2, Plt 188,   9/3/2019: CRP <1 mg/L, ESR 2, direct REJI negative; RF neg, CCP neg,     Recent laboratory results were reviewed, summarized, and discussed with the patient. Imaging    Musculoskeletal Ultrasound    None    Radiographs    1/11/22 XR hands:  Normal bilateral hand views. No fracture or evidence of inflammatory arthritis. CT Imaging    None    MR Imaging    None    DXA     None    ASSESSMENT AND PLAN    This is a follow-up visit for Ms. Cali Bruce with history of GERD and rosacea, for thumb pain related to de Quervain tenosynovitis and early osteoarthritis of the right > left CMC joint. Discussed risks and benefits of injection today, she is agreeable and 20mg of triamcinolone with 0.5mL of lidocaine were injected without immediate complication.  She was counseled to rest and ice the wrist today as needed. 1. Osteoarthritis of carpometacarpal (CMC) joint of both thumbs  - TRIAMCINOLONE ACETONIDE INJ  - triamcinolone acetonide (Kenalog) 40 mg/mL injection; 0.5 mL by Intra artICUlar route once for 1 dose. Dispense: 1 mL; Refill: 0  - lidocaine (XYLOCAINE) 10 mg/mL (1 %) injection; 1 mL by IntraDERMal route once for 1 dose. Dispense: 1 mL; Refill: 0  - DC DRAIN/INJECT INTERMEDIATE JOINT/BURSA    2. De Quervain's tenosynovitis, right  - TRIAMCINOLONE ACETONIDE INJ  - triamcinolone acetonide (Kenalog) 40 mg/mL injection; 0.5 mL by Intra artICUlar route once for 1 dose. Dispense: 1 mL; Refill: 0  - lidocaine (XYLOCAINE) 10 mg/mL (1 %) injection; 1 mL by IntraDERMal route once for 1 dose. Dispense: 1 mL; Refill: 0  - DC DRAIN/INJECT INTERMEDIATE JOINT/BURSA    3. Hypermobility arthralgia  -Cont meloxicam 15mg daily andTylenol up to 1000mg TID as needed for breakthrough  -Reviewed hot wax treatments for hands  -Compression gloves with activity      TODAY'S ORDERS    Orders Placed This Encounter    TRIAMCINOLONE ACETONIDE INJ    DC DRAIN/INJECT INTERMEDIATE JOINT/BURSA - [address]605    triamcinolone acetonide (Kenalog) 40 mg/mL injection    lidocaine (XYLOCAINE) 10 mg/mL (1 %) injection       No future appointments. Face to face time: 10 minutes (not including procedure)  Note preparation and records review day of service:  12 minutes  Total provider time day of service: 22 minutes    This was scribed by Syd Summers in the presence of Dr. Maikel Archer MD    Adult Rheumatology   [address] [address]   Phone [phone]  Fax [phone]    [address]  OFFICE PROCEDURE PROGRESS NOTE      Symptom relief from Right carpometacarpal Arthritis.  (09/06/22)     Chart reviewed for the following:   Xiomara Tyson MD, have reviewed the History, Physical and updated the Allergic reactions for Chelsi Fat [address] performed immediately prior to start of procedure:   IBrian MD, have performed the following reviews on Hermelinda Jara prior to the start of the procedure            * Patient was identified by name and date of birth   * Agreement on procedure being performed was verified  * Risks and Benefits explained to the patient  * Procedure site verified and marked as necessary  * Patient was positioned for comfort  * Consent was signed and verified     Time: 8:30 AM    Date of procedure: 9/6/2022    Procedure performed by: Biran Gray MD    Provider assisted by: self    Patient assisted by: self    How tolerated by patient: tolerated the procedure well with no complications      After consent was obtained, using sterile technique the right carpometacarpal joint was prepped and topical ethyl chloride spray was used as local anesthetic. The joint was entered using a 25G needle and no fluid was withdrawn. Kenalog 20 mg and 0.5 ml plain Lidocaine was then injected and the needle withdrawn. The procedure was well tolerated. The patient is asked to continue to rest the joint for a few more days before resuming regular activities. It may be more painful for the first 1-2 days. Watch for fever, or increased swelling or persistent pain in the joint. Call or return to clinic prn if such symptoms occur or there is failure to improve as anticipated.

## 2022-09-22 ENCOUNTER — TRANSCRIBE ORDER (OUTPATIENT)
Dept: SCHEDULING | Age: 42
End: 2022-09-22

## 2022-09-22 DIAGNOSIS — Z12.31 SCREENING MAMMOGRAM FOR HIGH-RISK PATIENT: Primary | ICD-10-CM

## 2022-10-17 ENCOUNTER — PATIENT MESSAGE (OUTPATIENT)
Dept: INTERNAL MEDICINE CLINIC | Age: 42
End: 2022-10-17

## 2022-10-17 RX ORDER — CALC/MAG/B COMPLEX/D3/HERB 61
15 TABLET ORAL
Qty: 90 CAPSULE | Refills: 1 | Status: SHIPPED | OUTPATIENT
Start: 2022-10-17 | End: 2022-10-24

## 2022-10-17 NOTE — TELEPHONE ENCOUNTER
From: Andry Wilkinson  To: Kelly Solano MD  Sent: 10/17/2022 8:03 AM EDT  Subject: Lansoprazole 30mg    Can I please get a refill of Lansoprazole 30mg sent to [facility]? Last time I asked, you gave me a 3 month supply and asked that I get labs done. I did get my labs done through my gyn office, [email or web address] , who said they would send them to your office. Thank you.

## 2022-10-24 ENCOUNTER — HOSPITAL ENCOUNTER (OUTPATIENT)
Dept: MAMMOGRAPHY | Age: 42
Discharge: HOME | End: 2022-10-24
Attending: INTERNAL MEDICINE
Payer: COMMERCIAL

## 2022-10-24 DIAGNOSIS — Z12.31 SCREENING MAMMOGRAM FOR HIGH-RISK PATIENT: ICD-10-CM

## 2022-10-24 PROCEDURE — 77063 BREAST TOMOSYNTHESIS BI: CPT

## 2022-10-24 RX ORDER — LANSOPRAZOLE 30 MG/1
30 CAPSULE, DELAYED RELEASE ORAL
Qty: 90 CAPSULE | Refills: 2 | Status: SHIPPED | OUTPATIENT
Start: 2022-10-24

## 2022-12-02 ENCOUNTER — TELEPHONE (OUTPATIENT)
Dept: INTERNAL MEDICINE CLINIC | Age: 42
End: 2022-12-02

## 2022-12-02 DIAGNOSIS — J01.00 ACUTE MAXILLARY SINUSITIS, RECURRENCE NOT SPECIFIED: Primary | ICD-10-CM

## 2022-12-02 RX ORDER — METHYLPREDNISOLONE 4 MG/1
TABLET ORAL
Qty: 1 DOSE PACK | Refills: 0 | Status: SHIPPED | OUTPATIENT
Start: 2022-12-02

## 2022-12-02 RX ORDER — AMOXICILLIN AND CLAVULANATE POTASSIUM 875; 125 MG/1; MG/1
1 TABLET, FILM COATED ORAL EVERY 12 HOURS
Qty: 14 TABLET | Refills: 0 | Status: SHIPPED | OUTPATIENT
Start: 2022-12-02

## 2022-12-02 NOTE — TELEPHONE ENCOUNTER
Progressive upper respiratory symptoms for more than a week. Now with purulent nasal discharge and cough productive of purulent sputum. Has a history of recurrent sinusitis. Requests treatment with antibiotics and steroids. We will provide those today and she will follow-up if symptoms do not improve.

## 2023-01-18 ENCOUNTER — TELEPHONE (OUTPATIENT)
Dept: RHEUMATOLOGY | Age: 43
End: 2023-01-18

## 2023-01-18 ENCOUNTER — CLINICAL SUPPORT (OUTPATIENT)
Dept: RHEUMATOLOGY | Age: 43
End: 2023-01-18
Payer: COMMERCIAL

## 2023-01-18 VITALS
RESPIRATION RATE: 16 BRPM | SYSTOLIC BLOOD PRESSURE: 131 MMHG | HEART RATE: 100 BPM | OXYGEN SATURATION: 99 % | DIASTOLIC BLOOD PRESSURE: 88 MMHG

## 2023-01-18 DIAGNOSIS — M77.11 LATERAL EPICONDYLITIS, RIGHT ELBOW: ICD-10-CM

## 2023-01-18 DIAGNOSIS — M25.50 HYPERMOBILITY ARTHRALGIA: Primary | ICD-10-CM

## 2023-01-18 PROCEDURE — 99214 OFFICE O/P EST MOD 30 MIN: CPT | Performed by: INTERNAL MEDICINE

## 2023-01-18 RX ORDER — MELOXICAM 15 MG/1
15 TABLET ORAL DAILY
Qty: 30 TABLET | Refills: 2 | Status: SHIPPED | OUTPATIENT
Start: 2023-01-18

## 2023-01-18 RX ORDER — PREDNISONE 20 MG/1
40 TABLET ORAL
Qty: 8 TABLET | Refills: 0 | Status: SHIPPED | OUTPATIENT
Start: 2023-01-18 | End: 2023-01-22

## 2023-01-18 NOTE — PROGRESS NOTES
REASON FOR VISIT    This is an urgent follow-up visit for Ms. Rosalie Holbrook for possible thumb injection. Inflammatory arthritis phenotype includes:  Anti-CCP positive: no  Rheumatoid factor positive: no  Erosive disease: N/A  Extra-articular manifestations include: benign hypermobility syndrome    Immunosuppression Screening:  Quantiferon TB: Not performed  PPD:  Not performed  Hepatitis B: Not performed  Hepatitis C: Not performed    Therapy History includes:  Current DMARD therapy include: hydroxychloroquine 400 mg daily (9/30/2020 to present)  Prior DMARD therapy include: none  Discontinued DMARDs because of inefficacy: None  Discontinued DMARDs because of side effects: None    HISTORY OF PRESENT ILLNESS    Ms. Rosalie Holbrook returns for an expedited follow-up. Last visit 9/6/2022. Pt says that she has had pain in her R elbow for the past three weeks. Starting about a week ago her hand began to give her intense pain. She says that she still has elbow pain right now, but her hand hurts 3 time more. She has been taking ibuprofen for the last three weeks but she can not tell if it helps much because her pain is intermittent. She notes the NSAIDs all seem to be hard on her stomach. Pt does not eat any meat. REVIEW OF SYSTEMS    A comprehensive review of systems was performed and pertinent results are documented in the HPI, review of systems is otherwise non-contributory. PAST MEDICAL HISTORY    She has a past medical history of Abnormal Pap smear, Headache, Herpes simplex without mention of complication, Ill-defined condition, Infertility, and Unspecified breast disorder. FAMILY HISTORY    Her family history includes Asthma in her brother; Elevated Lipids in her maternal grandfather and paternal grandmother; Hypertension in her father and paternal grandmother; No Known Problems in her mother. SOCIAL HISTORY    She reports that she has never smoked.  She has never used smokeless tobacco. She reports current alcohol use of about 2.0 standard drinks per week. She reports that she does not use drugs. 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 methylPREDNISolone (MEDROL DOSEPACK) 4 mg tablet As directed    lansoprazole (PREVACID) 30 mg capsule Take 1 Capsule by mouth Daily (before breakfast). Intent to wean off    eletriptan (RELPAX) 40 mg tablet TAKE 1 TABLET BY MOUTH FOR MIGRAINE. MAY REPEAT IN 2 HOURS IF NECESSARY. MAX 80MG/DAY. DO NOT TAKE WITH FIORICET    meloxicam (MOBIC) 15 mg tablet Take 1 Tablet by mouth daily. busPIRone (BUSPAR) 10 mg tablet TAKE ONE AND ONE-HALF TABLETS BY MOUTH TWICE A DAY    valACYclovir (VALTREX) 1 gram tablet TAKE 2 TABLETS BY MOUTH NOW &REPEAT IN 12HRS. *4 TABLETS TOTAL PER OUTBREAK*,    butalbital-acetaminophen-caffeine (FIORICET, ESGIC) -40 mg per tablet Take 2 tabs po only as needed daily for migraine  Indications: a migraine headache    melatonin 5 mg tablet Take 10 mg by mouth nightly. fluticasone propionate (FLONASE) 50 mcg/actuation nasal spray 2 Sprays by Both Nostrils route as needed. ibuprofen (MOTRIN) 800 mg tablet Take 800 mg by mouth every eight (8) hours as needed for Pain. No current facility-administered medications for this visit. ALLERGIES    No Known Allergies    PHYSICAL EXAMINATION    There were no vitals taken for this visit. There is no height or weight on file to calculate BMI. General:  The patient is well developed, well nourished, alert, and in no apparent distress. Eyes: Sclera are anicteric. No conjunctival injection. HEENT:  Oropharynx is clear. No oral ulcers. Adequate salivary pooling. No cervical or supraclavicular lymphadenopathy. Lungs:  Clear to auscultation bilaterally, without wheeze or stridor. Normal respiratory effort. Cor:  Regular rate and rhythm. No murmur rub or gallop. Abdomen: Soft, non-tender, without hepatomegaly or masses. Extremities: No calf tenderness or edema. Warm and well perfused. Skin:  No significant abnormalities. Neuro: Nonfocal  Musculoskeletal:    A comprehensive musculoskeletal exam was performed for all joints of each upper and lower extremity and assessed for swelling, tenderness and range of motion. Results are documented as below:  No evidence of synovitis in the small joints of the hands, wrists, shoulders, elbows, hips, knees or ankles. ___  General:  The patient is well developed, well nourished, alert, and in no apparent distress. Carrying foster baby today. Eyes: Sclera are anicteric. No conjunctival injection. HEENT:  Oropharynx is clear. No oral ulcers. Adequate salivary pooling. No cervical or supraclavicular lymphadenopathy. Previously small torus palatinus. Lungs:  Normal respiratory effort. Cor:  Regular rate and rhythm. Abdomen: Soft, non-tender, without hepatomegaly or masses. Extremities: No calf tenderness or edema. Warm and well perfused. Skin:  Unchanged mild facial telangiectasias and erythema consistent with rosacea. Neuro: Nonfocal, no foot or wrist drop, normal unassisted gait. Musculoskeletal:   A comprehensive musculoskeletal exam was performed for all joints of each upper and lower extremity and assessed for swelling, tenderness and range of motion. Results are documented as below: Today mildly +Finkelstein maneuver on right. Early bilateral CMC squaring with associated tenderness. Hypermobility through MCPs, elbow  Beighton score previously 6/9, not reexamined today. No synovitis of the hands. No evidence of synovitis in the small joints of the wrists, shoulders, elbows, hips, knees or ankles.        DATA REVIEW    Laboratory     10/8/21: Cr 0.55, LFT WNL, WBC 5.1, HGB 14.2, Plt 188,   9/3/2019: CRP <1 mg/L, ESR 2, direct REJI negative; RF neg, CCP neg,     Recent laboratory results were reviewed, summarized, and discussed with the patient. Imaging    Musculoskeletal Ultrasound    None    Radiographs    1/11/22 XR hands:  Normal bilateral hand views. No fracture or evidence of inflammatory arthritis. CT Imaging    None    MR Imaging    None    DXA     None    ASSESSMENT AND PLAN    This is a follow-up visit for Ms. Danelle Isaac with history of GERD and rosacea, for thumb pain related to de Quervain tenosynovitis and early osteoarthritis of the right > left CMC joint. Discussed risks and benefits of injection today, she is agreeable and 20mg of triamcinolone with 0.5mL of lidocaine were injected without immediate complication. She was counseled to rest and ice the wrist today as needed. 1. Osteoarthritis of carpometacarpal (CMC) joint of both thumbs  - TRIAMCINOLONE ACETONIDE INJ  - triamcinolone acetonide (Kenalog) 40 mg/mL injection; 0.5 mL by Intra artICUlar route once for 1 dose. Dispense: 1 mL; Refill: 0  - lidocaine (XYLOCAINE) 10 mg/mL (1 %) injection; 1 mL by IntraDERMal route once for 1 dose. Dispense: 1 mL; Refill: 0  - [place] DRAIN/INJECT INTERMEDIATE JOINT/BURSA    2. De Quervain's tenosynovitis, right  - TRIAMCINOLONE ACETONIDE INJ  - triamcinolone acetonide (Kenalog) 40 mg/mL injection; 0.5 mL by Intra artICUlar route once for 1 dose. Dispense: 1 mL; Refill: 0  - lidocaine (XYLOCAINE) 10 mg/mL (1 %) injection; 1 mL by IntraDERMal route once for 1 dose. Dispense: 1 mL; Refill: 0  - [place] DRAIN/INJECT INTERMEDIATE JOINT/BURSA    3. Hypermobility arthralgia  -Cont meloxicam 15mg daily andTylenol up to 1000mg TID as needed for breakthrough  -Reviewed hot wax treatments for hands  -Compression gloves with activity      TODAY'S ORDERS    No orders of the defined types were placed in this encounter.       Future Appointments   Date Time Provider Cooper Sia   1/18/2023  1:00 PM You Lazaro MD [facility] AMB Face to face time: minutes   Note preparation and records review day of service:  minutes  Total provider time day of service: minutes

## 2023-01-18 NOTE — TELEPHONE ENCOUNTER
Called pt per request of Dr. Nurys Donohue to get pt in for an appointment today 01/18/2023 at 1. Pt did not answer, left vm to call back.

## 2023-01-18 NOTE — PROGRESS NOTES
Chief Complaint   Patient presents with    Joint Pain     1. Have you been to the ER, urgent care clinic since your last visit? Hospitalized since your last visit? No    2. Have you seen or consulted any other health care providers outside of the [address] [place] [address] since your last visit? Include any pap smears or colon screening.  No

## 2023-01-18 NOTE — PATIENT INSTRUCTIONS
1) I am prescribing 40mg of Prednisone for you to take for four days. 2) After you finish Prednisone take Meloxicam consisently for a month. You can also take 650mg of Tylenol up to three times daily. 3) Follow up on an as needed basis.

## 2023-03-02 ENCOUNTER — OFFICE VISIT (OUTPATIENT)
Dept: RHEUMATOLOGY | Age: 43
End: 2023-03-02
Payer: COMMERCIAL

## 2023-03-02 VITALS
RESPIRATION RATE: 16 BRPM | HEART RATE: 89 BPM | SYSTOLIC BLOOD PRESSURE: 145 MMHG | BODY MASS INDEX: 21.44 KG/M2 | WEIGHT: 145.2 LBS | TEMPERATURE: 98.9 F | OXYGEN SATURATION: 100 % | DIASTOLIC BLOOD PRESSURE: 84 MMHG

## 2023-03-02 DIAGNOSIS — M13.80 MIGRATORY POLYARTHRITIS: ICD-10-CM

## 2023-03-02 DIAGNOSIS — M18.0 OSTEOARTHRITIS OF CARPOMETACARPAL (CMC) JOINT OF BOTH THUMBS: Primary | ICD-10-CM

## 2023-03-02 DIAGNOSIS — M65.4 DE QUERVAIN'S TENOSYNOVITIS, RIGHT: ICD-10-CM

## 2023-03-02 DIAGNOSIS — M25.50 HYPERMOBILITY ARTHRALGIA: ICD-10-CM

## 2023-03-02 DIAGNOSIS — M77.11 LATERAL EPICONDYLITIS, RIGHT ELBOW: ICD-10-CM

## 2023-03-02 PROCEDURE — 99215 OFFICE O/P EST HI 40 MIN: CPT | Performed by: INTERNAL MEDICINE

## 2023-03-02 RX ORDER — DICLOFENAC SODIUM 75 MG/1
75 TABLET, DELAYED RELEASE ORAL 2 TIMES DAILY
Qty: 60 TABLET | Refills: 2 | Status: SHIPPED | OUTPATIENT
Start: 2023-03-02

## 2023-03-02 NOTE — PATIENT INSTRUCTIONS
1) I am prescribing Diclofenac to take twice daily for at least the next two weeks. Do not take this on the same day you take Meloxicam or any other NSAIDs. Let me know if this is no helpful or if it upsets your stomach and we can try another NSAID. 2) You can take 650mg of Tylenol up to 3 times a day for joint pain. You can also use Voltaren gel on your hands as needed for hand and wrist pain. 3) Try a spica splint when you do not need to be active with your hand to immobilize your rist and reduce pain. 4) I am referring you to a hand orthopedic doctor. Call the number on the printout to schedule. 5) I am ordering a hand MRI. Call [phone] to schedule. 6) Check labs at your earliest convenience. 7) Follow up in one month. Let me know if you have any questions or concerns in the meantime. If you would like a second opinion or for a follow up later this year I recommend Anna Hickman call [phone].

## 2023-03-02 NOTE — PROGRESS NOTES
REASON FOR VISIT    This is an in-office follow-up visit for Ms. Denise Walker for hypermobility arthralgia. Inflammatory arthritis phenotype includes:  Anti-CCP positive: no  Rheumatoid factor positive: no  Erosive disease: N/A  Extra-articular manifestations include: benign hypermobility syndrome    Immunosuppression Screening:  Quantiferon TB: Not performed  PPD:  Not performed  Hepatitis B: Not performed  Hepatitis C: Not performed    Therapy History includes:  Current DMARD therapy include: hydroxychloroquine 400 mg daily (9/30/2020 to present)  Prior DMARD therapy include: none  Discontinued DMARDs because of inefficacy: None  Discontinued DMARDs because of side effects: None    HISTORY OF PRESENT ILLNESS    Ms. Denise Walker returns for an expedited follow-up. Last visit 1/18/2023. Pt takes Meloxicam for joint pain. She says that this does not help her joint pain. Pt elbow is still painful, but it is not her primary concern. Her primary concern if her R thumb and wrist. She notes that every task she does is impacted by her wrist pain. She says that she has pain when she does easy tasks, such as picking up a pot from the stove and bringing it to the pain. Pt says that her fingers are usually stiff and swollen for 30 minutes in the morning. Pt recalls that about 4 years ago she woke up and her hands were \"stiff as boards. \" She can not recall anything that set this sudden pain off. After this happened for a while she got her blood work to be worked up for [employer], and it was negative. This was right before she first saw Dr. Seema Mac for her wrists and fingers. Around this time she also started to have hip pain, which lasted for 3-6 months. Pt has pain in her knees and ankles sometimes when walking up stairs and getting up and down off the ground. She notes that she did not notice this pain until all of the sudden a couple of weeks ago.      Pt reports that she has pain in her spine when she goes from standing to sitting. Her pain goes away once she starts moving. She says that she will notice the spine pain for 2-3 months and then she will not notice it for a while. Pt has a 5year old, two 8year olds, and a 15year old. REVIEW OF SYSTEMS    A comprehensive review of systems was performed and pertinent results are documented in the HPI, review of systems is otherwise non-contributory. PAST MEDICAL HISTORY    She has a past medical history of Abnormal Pap smear, Headache, Herpes simplex without mention of complication, Ill-defined condition, Infertility, and Unspecified breast disorder. FAMILY HISTORY    Her family history includes Asthma in her brother; Elevated Lipids in her maternal grandfather and paternal grandmother; Hypertension in her father and paternal grandmother; No Known Problems in her mother. SOCIAL HISTORY    She reports that she has never smoked. She has never used smokeless tobacco. She reports current alcohol use of about 2.0 standard drinks per week. She reports that she does not use drugs. 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 amoxicillin-clavulanate (AUGMENTIN) 875-125 mg per tablet Take 1 Tablet by mouth every twelve (12) hours. lansoprazole (PREVACID) 30 mg capsule Take 1 Capsule by mouth Daily (before breakfast). Intent to wean off    eletriptan (RELPAX) 40 mg tablet TAKE 1 TABLET BY MOUTH FOR MIGRAINE. MAY REPEAT IN 2 HOURS IF NECESSARY. MAX 80MG/DAY. DO NOT TAKE WITH FIORICET    busPIRone (BUSPAR) 10 mg tablet TAKE ONE AND ONE-HALF TABLETS BY MOUTH TWICE A DAY    valACYclovir (VALTREX) 1 gram tablet TAKE 2 TABLETS BY MOUTH NOW &REPEAT IN 12HRS.  *4 TABLETS TOTAL PER OUTBREAK*,    butalbital-acetaminophen-caffeine (FIORICET, ESGIC) -40 mg per tablet Take 2 tabs po only as needed daily for migraine  Indications: a migraine headache    melatonin 5 mg tablet Take 10 mg by mouth nightly. fluticasone propionate (FLONASE) 50 mcg/actuation nasal spray 2 Sprays by Both Nostrils route as needed. ibuprofen (MOTRIN) 800 mg tablet Take 800 mg by mouth every eight (8) hours as needed for Pain. No current facility-administered medications for this visit. ALLERGIES    No Known Allergies    PHYSICAL EXAMINATION    There were no vitals taken for this visit. General:  The patient is well developed, well nourished, alert, and in no apparent distress. Eyes: Sclera are anicteric. No conjunctival injection. HEENT:  Oropharynx is clear. No oral ulcers. Adequate salivary pooling. No cervical or supraclavicular lymphadenopathy. Lungs:  Clear to auscultation bilaterally, without wheeze or stridor. Normal respiratory effort. Cor:  Regular rate and rhythm. No murmur rub or gallop. Abdomen: Soft, non-tender, without hepatomegaly or masses. Extremities: No calf tenderness or edema. Warm and well perfused. Skin:  No significant abnormalities. Neuro: Nonfocal  Musculoskeletal:    A comprehensive musculoskeletal exam was performed for all joints of each upper and lower extremity and assessed for swelling, tenderness and range of motion. Results are documented as below:  No evidence of synovitis in the small joints of the hands, wrists, shoulders, elbows, hips, knees or ankles. ____  General:  The patient is well developed, well nourished, alert, and in no apparent distress. Carrying foster baby today. Eyes: Sclera are anicteric. No conjunctival injection. HEENT:  Oropharynx is clear. No oral ulcers. Adequate salivary pooling. No cervical or supraclavicular lymphadenopathy. Previously small torus palatinus. Lungs:  Normal respiratory effort. Cor:  Regular rate and rhythm. Abdomen: Soft, non-tender, without hepatomegaly or masses. Extremities: No calf tenderness or edema.  Warm and well perfused. Skin:  Unchanged mild facial telangiectasias and erythema consistent with rosacea. Neuro: Nonfocal, no foot or wrist drop, normal unassisted gait. Musculoskeletal:   A comprehensive musculoskeletal exam was performed for all joints of each upper and lower extremity and assessed for swelling, tenderness and range of motion. Results are documented as below:   Improved right CMC tenderness. Hypermobility through MCPs, elbow  Joint line tenderness and lateral epicondylar tenderness of right elbow  Beighton score previously 6/9, not reexamined today. No synovitis of the hands. No evidence of synovitis in the small joints of the wrists, shoulders, elbows, hips, knees or ankles. DATA REVIEW    Laboratory     10/8/21: Cr 0.55, LFT WNL, WBC 5.1, HGB 14.2, Plt 188,   9/3/2019: CRP <1 mg/L, ESR 2, direct REJI negative; RF neg, CCP neg,     Recent laboratory results were reviewed, summarized, and discussed with the patient. Imaging    Musculoskeletal Ultrasound    None    Radiographs    1/11/22 XR hands:  Normal bilateral hand views. No fracture or evidence of inflammatory arthritis. CT Imaging    None    MR Imaging    None    DXA     None    ASSESSMENT AND PLAN    This is a follow-up visit for Ms. Dwight Becerra with history of GERD and rosacea, for right elbow and CMC tenderness with poor response to conservative management with bracing and topical diclofenac. She has upcoming caregiver responsibilities for which will attempt a brief prednisone course and meloxicam maintenance. 1. Hypermobility arthralgia  - predniSONE (DELTASONE) 20 mg tablet; Take 2 Tablets by mouth daily (with breakfast) for 4 days. If you feel lightheaded or have difficulty sleeping, reduce dose to 20mg daily. Take with breakfast.  Dispense: 8 Tablet; Refill: 0  - meloxicam (MOBIC) 15 mg tablet; Take 1 Tablet by mouth daily. Dispense: 30 Tablet; Refill: 2    2.  Lateral epicondylitis, right elbow  - predniSONE (DELTASONE) 20 mg tablet; Take 2 Tablets by mouth daily (with breakfast) for 4 days. If you feel lightheaded or have difficulty sleeping, reduce dose to 20mg daily. Take with breakfast.  Dispense: 8 Tablet; Refill: 0  - meloxicam (MOBIC) 15 mg tablet; Take 1 Tablet by mouth daily. Dispense: 30 Tablet; Refill: 2      There are no Patient Instructions on file for this visit. TODAY'S ORDERS    No orders of the defined types were placed in this encounter.         Future Appointments   Date Time Provider Cooper Sia   3/2/2023  8:20 AM Paco Boss MD [facility] BS AMB         Face to face time: minutes   Note preparation and records review day of service: minutes  Total provider time day of service: minutes IMMUNOASSAY    Blank Elbow, Hand, Wrist [facility]    diclofenac EC (VOLTAREN) 75 mg EC tablet       Future Appointments   Date Time Provider Cooper Sia   4/25/2023  9:00 AM Ebonie Mckeon MD [facility] BS AMB   5/3/2023  9:40 AM Azalea Irby MD [facility] BS AMB     Face to face time: 48 minutes   Note preparation and records review day of service: 2- minutes  Total provider time day of service: 68 minutes    This was scribed by Cassie Stearns in the presence of Dr. Mervat Kumari. The note was reviewed and amended personally, and I agree with the above information.     Renae Barrientos MD    Adult Rheumatology   [facility]  A Part of [facility], [address] [place] [address]   Phone [phone]  Fax [phone]

## 2023-03-03 LAB
ALBUMIN SERPL-MCNC: 3.7 G/DL (ref 3.5–5)
ALBUMIN/GLOB SERPL: 1.5 (ref 1.1–2.2)
ALP SERPL-CCNC: 53 U/L (ref 45–117)
ALT SERPL-CCNC: 22 U/L (ref 12–78)
ANION GAP SERPL CALC-SCNC: 2 MMOL/L (ref 5–15)
AST SERPL-CCNC: 20 U/L (ref 15–37)
BASOPHILS # BLD: 0.1 K/UL (ref 0–0.1)
BASOPHILS NFR BLD: 1 % (ref 0–1)
BILIRUB SERPL-MCNC: 0.2 MG/DL (ref 0.2–1)
BUN SERPL-MCNC: 18 MG/DL (ref 6–20)
BUN/CREAT SERPL: 30 (ref 12–20)
CALCIUM SERPL-MCNC: 8.6 MG/DL (ref 8.5–10.1)
CHLORIDE SERPL-SCNC: 108 MMOL/L (ref 97–108)
CO2 SERPL-SCNC: 29 MMOL/L (ref 21–32)
CREAT SERPL-MCNC: 0.61 MG/DL (ref 0.55–1.02)
CRP SERPL-MCNC: <0.29 MG/DL (ref 0–0.6)
DIFFERENTIAL METHOD BLD: ABNORMAL
EOSINOPHIL # BLD: 0.2 K/UL (ref 0–0.4)
EOSINOPHIL NFR BLD: 3 % (ref 0–7)
ERYTHROCYTE [DISTWIDTH] IN BLOOD BY AUTOMATED COUNT: 14.6 % (ref 11.5–14.5)
ERYTHROCYTE [SEDIMENTATION RATE] IN BLOOD: 3 MM/HR (ref 0–20)
GLOBULIN SER CALC-MCNC: 2.5 G/DL (ref 2–4)
GLUCOSE SERPL-MCNC: 91 MG/DL (ref 65–100)
HCT VFR BLD AUTO: 42 % (ref 35–47)
HGB BLD-MCNC: 13 G/DL (ref 11.5–16)
IMM GRANULOCYTES # BLD AUTO: 0 K/UL (ref 0–0.04)
IMM GRANULOCYTES NFR BLD AUTO: 0 % (ref 0–0.5)
LYMPHOCYTES # BLD: 1.5 K/UL (ref 0.8–3.5)
LYMPHOCYTES NFR BLD: 20 % (ref 12–49)
MCH RBC QN AUTO: 28.2 PG (ref 26–34)
MCHC RBC AUTO-ENTMCNC: 31 G/DL (ref 30–36.5)
MCV RBC AUTO: 91.1 FL (ref 80–99)
MONOCYTES # BLD: 0.6 K/UL (ref 0–1)
MONOCYTES NFR BLD: 8 % (ref 5–13)
NEUTS SEG # BLD: 5.1 K/UL (ref 1.8–8)
NEUTS SEG NFR BLD: 68 % (ref 32–75)
NRBC # BLD: 0 K/UL (ref 0–0.01)
NRBC BLD-RTO: 0 PER 100 WBC
PLATELET # BLD AUTO: 215 K/UL (ref 150–400)
PMV BLD AUTO: 11.3 FL (ref 8.9–12.9)
POTASSIUM SERPL-SCNC: 4.2 MMOL/L (ref 3.5–5.1)
PROT SERPL-MCNC: 6.2 G/DL (ref 6.4–8.2)
RBC # BLD AUTO: 4.61 M/UL (ref 3.8–5.2)
SODIUM SERPL-SCNC: 139 MMOL/L (ref 136–145)
WBC # BLD AUTO: 7.4 K/UL (ref 3.6–11)

## 2023-03-04 LAB
CENTROMERE B AB SER-ACNC: <0.2 AI (ref 0–0.9)
CHROMATIN AB SERPL-ACNC: <0.2 AI (ref 0–0.9)
DSDNA AB SER-ACNC: 1 IU/ML (ref 0–9)
ENA JO1 AB SER-ACNC: <0.2 AI (ref 0–0.9)
ENA RNP AB SER-ACNC: <0.2 AI (ref 0–0.9)
ENA SCL70 AB SER-ACNC: <0.2 AI (ref 0–0.9)
ENA SM AB SER-ACNC: <0.2 AI (ref 0–0.9)
ENA SM+RNP AB SER-ACNC: <0.2 AI (ref 0–0.9)
ENA SS-A AB SER-ACNC: <0.2 AI (ref 0–0.9)
ENA SS-B AB SER-ACNC: <0.2 AI (ref 0–0.9)
RIBOSOMAL P AB SER-ACNC: <0.2 AI (ref 0–0.9)
SEE BELOW:, 164879: NORMAL

## 2023-03-06 LAB — LYME TOTAL ANTIBODY EIA: NEGATIVE

## 2023-03-15 LAB
14-3-3 ETA AG SER IA-MCNC: <0.2 NG/ML
CCP IGA+IGG SERPL IA-ACNC: <20 UNITS
RHEUMATOID FACT SERPL-ACNC: 19.6 UNITS/ML

## 2023-03-16 NOTE — PROGRESS NOTES
HPI: Loli Damon (: 1980) is a 36 y.o. female, patient, here for evaluation of the following chief complaint(s):    Wrist Pain (Right wrist and elbow pain. States has episodes of right elbow pain for years which radiates to the wrist. Wearing thumb spica splint which helps. C/o burning, tingling pain primarily on radial wrist but radiates to hand. Rheumatologist obtained x-rays on 21 and has ordered an MRI. Right hand dominant mother of 5.)  Patient is seen today to evaluate her hands. She has complained of pain and tendinitis involving her right arm more than her left. She states that right elbow tendinitis began at the start of the new year and then moved more towards the wrist.  She has been seen by rheumatologist.  The patient reports that her rheumatologist is moving but has diagnosed her with hypermobility arthritis. The patient has tested previously negative for any inflammatory arthritis such as rheumatoid arthritis or lupus. She also has been told that she does not have Erlinda-Danlos syndrome although she states that she has a child currently being tested. Patient complains of some burning pain and tingling to the dorsum of the hand more than the palmar aspect. She denies any fall or injury. Vitals:  Ht 5' 10\" (1.778 m)   Wt 142 lb (64.4 kg)   BMI 20.37 kg/m²    Body mass index is 20.37 kg/m². No Known Allergies    Current Outpatient Medications   Medication Sig    diclofenac EC (VOLTAREN) 75 mg EC tablet Take 1 Tablet by mouth two (2) times a day. lansoprazole (PREVACID) 30 mg capsule Take 1 Capsule by mouth Daily (before breakfast). Intent to wean off    eletriptan (RELPAX) 40 mg tablet TAKE 1 TABLET BY MOUTH FOR MIGRAINE. MAY REPEAT IN 2 HOURS IF NECESSARY. MAX 80MG/DAY. DO NOT TAKE WITH FIORICET    busPIRone (BUSPAR) 10 mg tablet TAKE ONE AND ONE-HALF TABLETS BY MOUTH TWICE A DAY    valACYclovir (VALTREX) 1 gram tablet TAKE 2 TABLETS BY MOUTH NOW &REPEAT IN 12HRS.  *4 TABLETS TOTAL PER OUTBREAK*,    butalbital-acetaminophen-caffeine (FIORICET, ESGIC) -40 mg per tablet Take 2 tabs po only as needed daily for migraine  Indications: a migraine headache    melatonin 5 mg tablet Take 10 mg by mouth nightly. No current facility-administered medications for this visit. Past Medical History:   Diagnosis Date    Abnormal Pap smear     Colposcopy in     Headache     migraines    Herpes simplex without mention of complication     Cold sores (Oct 2013)    Ill-defined condition     rosacia    Infertility     IVF with first 3 pregnancies, not this pregnancy    Unspecified breast disorder     biospy right breast,galactocele        Past Surgical History:   Procedure Laterality Date    HX BREAST AUGMENTATION Bilateral 2015    BILATERAL BREAST AUGMENTATION W RIGHT BREAST PERIAREOLAR MASTOPEXY SILICONE  performed by Carlos Beck MD at [facility]    HX BREAST AUGMENTATION Bilateral 2015    [facility]- [facility] Deluna    HX BREAST REDUCTION Right 2015    BREAST MASTOPEXY performed by Carlos Beck MD at [facility]    HX  SECTION      HX  SECTION      HX LEEP PROCEDURE      HX OTHER SURGICAL      Laproscopy for Infertility    HX OTHER SURGICAL      LEEP    HX OTHER SURGICAL      D&C    IMPLANT BREAST SILICONE/EQ Bilateral         [place]  DELIVERY ONLY  2012    Twins    [place] EXPLORATORY LAPAROTOMY CELIOTOMY W/WO BIOPSY SPX      For infertility       Family History   Problem Relation Age of Onset    Hypertension Father     Asthma Brother     Elevated Lipids Maternal Grandfather     Elevated Lipids Paternal Grandmother     Hypertension Paternal Grandmother     No Known Problems Mother         Social History     Tobacco Use    Smoking status: Never    Smokeless tobacco: Never   Vaping Use    Vaping Use: Never used   Substance Use Topics    Alcohol use:  Yes     Alcohol/week: 2.0 standard drinks     Types: 1 Glasses of wine, 1 Cans of beer per week     Comment: rarely    Drug use: No        Review of Systems   All other systems reviewed and are negative. Physical Exam    Patient demonstrates full elbow, forearm, wrist and digital range of motion. She has diffuse discomfort to the dorsal wrist hand and lateral aspect of the right elbow. She did not have a classic Finkelstein test and at times did have tenderness to the first dorsal compartment but not at all times. There is no pain or crepitation and did not appear that she had significant hypermobility but does have flexibility with her joints including the thumb CMC region. No locking. Equivocal Tinel's Phalen's and nerve compression test bilaterally. Imaging:    XR Results (most recent):  Results from Appointment encounter on 01/11/22    XR HAND RT MIN 3 V    Narrative  EXAM: XR HAND LT MIN 3 V, XR HAND RT MIN 3 V    INDICATION: Multifocal joint pain, including bilateral hand pain, possible  inflammatory arthritis    COMPARISON: None. TECHNIQUE: 3 views of the bilateral hands (2 separate studies reported together)    FINDINGS: No fracture or dislocation on plain film. No joint space narrowing. No  joint space erosion or periosteal reaction. Alignment is within normal limits. Bone mineralization is within normal limits. No soft tissue calcification. Impression  Normal bilateral hand views. No fracture or evidence of inflammatory arthritis. ASSESSMENT/PLAN:  Below is the assessment and plan developed based on review of pertinent history, physical exam, labs, studies, and medications. Patient's examination was clinically consistent with both hand tendinitis although carpal tunnel syndrome cannot be excluded. There may be a component of de Quervain's tenosynovitis and or lateral condylitis. I recommended electrodiagnostic evaluation. She also has already tried a Medrol Dosepak without relief.   She has some component certainly of flexibility perhaps even hypermobility but not Erlinda-Danlos syndrome. I will see her back after the EMG has been completed and also her rheumatologist has already ordered an MRI of her wrist hand region by her report. 1. Right hand pain  2. Bilateral carpal tunnel syndrome  -     EMG TWO EXTREMITIES UPPER; Future  -     REFERRAL TO PHYSIATRY  3. Tendinitis of both hands      Return for Follow-up after EMG test completion. An electronic signature was used to authenticate this note.   -- Chan Ortiz MD

## 2023-03-20 ENCOUNTER — OFFICE VISIT (OUTPATIENT)
Dept: ORTHOPEDIC SURGERY | Age: 43
End: 2023-03-20
Payer: COMMERCIAL

## 2023-03-20 VITALS — BODY MASS INDEX: 20.33 KG/M2 | WEIGHT: 142 LBS | HEIGHT: 70 IN

## 2023-03-20 DIAGNOSIS — G56.03 BILATERAL CARPAL TUNNEL SYNDROME: ICD-10-CM

## 2023-03-20 DIAGNOSIS — M65.4 DE QUERVAIN'S TENOSYNOVITIS, RIGHT: ICD-10-CM

## 2023-03-20 DIAGNOSIS — M79.641 RIGHT HAND PAIN: Primary | ICD-10-CM

## 2023-03-20 DIAGNOSIS — M77.8 TENDINITIS OF BOTH HANDS: ICD-10-CM

## 2023-03-20 PROCEDURE — 99203 OFFICE O/P NEW LOW 30 MIN: CPT | Performed by: ORTHOPAEDIC SURGERY

## 2023-03-20 NOTE — LETTER
3/20/2023    Patient: Santhosh Lab   YOB: 1980   Date of Visit: 3/20/2023     Jennifer Page MD  [address]  [address]  [place] [address] [address]  [place], [address] [address]  [place] [facility] [address]    Dear MD Ludivina Devries MD,      Thank you for referring Ms. Angel Palencia to [facility] for evaluation. My notes for this consultation are attached. If you have questions, please do not hesitate to call me. I look forward to following your patient along with you.       Sincerely,    Tirso Headley MD

## 2023-03-20 NOTE — PATIENT INSTRUCTIONS
Hand Exercises      Tendon glides   In this exercise, the steps follow one another to a make a continuous movement. With your affected hand, point your fingers and thumb straight up. Your wrist should be relaxed, following the line of your fingers and thumb. Curl your fingers so that the top two joints in them are bent, and your fingers wrap down. Your fingertips should touch or be near the base of your fingers. Your fingers will look like a hook. Make a fist by bending your knuckles. Your thumb can gently rest against your index (pointing) finger. Unwind your fingers slightly so that your fingertips can touch the base of your palm. Your thumb can rest against your index finger. Move back to your starting position, with your fingers and thumb pointing up. Repeat the series of motions 8 to 12 times. Switch hands and repeat steps 1 through 6, even if only one hand is sore. Intrinsic flexion   Rest your affected hand on a table and bend the large joints where your fingers connect to your hand. Keep your thumb and the other joints in your fingers straight. Slowly straighten your fingers. Your wrist should be relaxed, following the line of your fingers and thumb. Move back to your starting position, with your hand bent. Repeat 8 to 12 times. Switch hands and repeat steps 1 through 4, even if only one hand is sore. Finger extension   Place your affected hand flat on a table. Lift and then lower one finger at a time off the table. Repeat 8 to 12 times. Switch hands and repeat steps 1 through 3, even if only one hand is sore. MP extension   Place your good hand on a table, palm up. Put your affected hand on top of your good hand with your fingers wrapped around the thumb of your good hand like you are making a fist.  Slowly uncurl the joints of your affected hand where your fingers connect to your hand so that only the top two joints of your fingers are bent. Your fingers will look like a hook.   Move back to your starting position, with your fingers wrapped around your good thumb. Repeat 8 to 12 times. Switch hands and repeat steps 1 through 4, even if only one hand is sore. Wrist: Exercises  Introduction  Here are some examples of exercises for you to try. The exercises may be suggested for a condition or for rehabilitation. Start each exercise slowly. Ease off the exercises if you start to have pain. You will be told when to start these exercises and which ones will work best for you. How to do the exercises  Prayer stretch  Start with your palms together in front of your chest just below your chin. Slowly lower your hands toward your waistline, keeping your hands close to your stomach and your palms together until you feel a mild to moderate stretch under your forearms. Hold for at least 15 to 30 seconds. Repeat 2 to 4 times. Wrist flexor stretch  Extend your arm in front of you with your palm up. Bend your wrist, pointing your hand toward the floor. With your other hand, gently bend your wrist farther until you feel a mild to moderate stretch in your forearm. Hold for at least 15 to 30 seconds. Repeat 2 to 4 times. Wrist extensor stretch  Repeat steps 1 through 4 of the stretch above, but begin with your extended hand palm down. Follow-up care is a key part of your treatment and safety. Be sure to make and go to all appointments, and call your doctor if you are having problems. It's also a good idea to know your test results and keep a list of the medicines you take. Current as of: March 9, 2022               Content Version: 13.4  © 2006-2022 Healthwise, Incorporated. Care instructions adapted under license by [employer] (which disclaims liability or warranty for this information).  If you have questions about a medical condition or this instruction, always ask your healthcare professional. [place] [address] any warranty or liability for your use of this information. Electromyogram (EMG) and Nerve Conduction Studies: About These Tests  What are they? An electromyogram (EMG) measures the electrical activity of your muscles when you are not using them (at rest) and when you tighten them (muscle contraction). Nerve conduction studies (NCS) measure how well and how fast the nerves can send electrical signals. EMG and nerve conduction studies are often done together. If they are done together, the nerve conduction studies are done before the EMG. Why are they done? You may need an EMG to find diseases that damage your muscles or nerves or to find why you can't move your muscles (paralysis), why they feel weak, or why they twitch. These problems may include a herniated disc, amyotrophic lateral sclerosis (ALS), or myasthenia gravis (MG). You may need nerve conduction studies to find damage to the nerves that lead from the brain and spinal cord to the rest of the body. (This is called the peripheral nervous system.) These studies are often used to help find nerve disorders, such as carpal tunnel syndrome. How do you prepare for these tests? Wear loose-fitting clothing. You may be given a hospital gown to wear. The electrodes for the test are attached to your skin. Your skin needs to be clean and free of sprays, oils, creams, and lotions. You may be asked to sign a consent form that says you understand the risks of the test and agree to have it done. Tell your doctor ALL the medicines, vitamins, supplements, and herbal remedies you take. Some may increase the risk of problems during your test. Your doctor will tell you if you should stop taking any of them before the test and how soon to do it. If you take a medicine that prevents blood clots, your doctor may tell you to stop taking it before your test. Or your doctor may tell you to keep taking it.  (These medicines include aspirin and other blood thinners.) Make sure that you understand exactly what your doctor wants you to do. How are the tests done? You lie on a table or bed or sit in a reclining chair so your muscles are relaxed. For an EMG:   Your doctor will insert a needle electrode into a muscle. This will record the electrical activity while the muscle is at rest.  Your doctor will ask you to tighten the same muscle slowly and steadily while the electrical activity is recorded. Your doctor may move the electrode to a different area of the muscle or a different muscle. For nerve conduction studies:   Your doctor will attach two types of electrodes to your skin. One type of electrode is placed over a nerve and will give the nerve an electrical pulse. The other type of electrode is placed over the muscle that the nerve controls. It will record how long it takes the muscle to react to the electrical pulse. How does having electromyogram (EMG) and nerve conduction studies feel? During an EMG test, you may feel a quick, sharp pain when the needle electrode is put into a muscle. With nerve conduction studies, you will be able to feel the electrical pulses. The tests make some people anxious. Keep in mind that only a very low-voltage electrical current is used. And each electrical pulse is very quick. It lasts less than a second. How long do they take? An EMG may take 30 to 60 minutes. Nerve conduction tests may take from 15 minutes to 1 hour or more. It depends on how many nerves and muscles your doctor tests. What happens after these tests? After the test, you may be sore and feel a tingling in your muscles. This may last for up to 2 days. If any of the test areas are sore:  Put ice or a cold pack on the area for 10 to 20 minutes at a time. Put a thin cloth between the ice and your skin. Take an over-the-counter pain medicine, such as acetaminophen (Tylenol), ibuprofen (Advil, Motrin), or naproxen (Aleve). Be safe with medicines. Read and follow all instructions on the label.   You will probably be able to go home right away. It depends on the reason for the test.  You can go back to your usual activities right away. When should you call for help? Watch closely for changes in your health, and be sure to contact your doctor if:  Muscle pain from an EMG test gets worse or you have swelling, tenderness, or pus at any of the needle sites. You have any problems that you think may be from the test.  You have any questions about the test or have not received your results. Follow-up care is a key part of your treatment and safety. Be sure to make and go to all appointments, and call your doctor if you are having problems. It's also a good idea to keep a list of the medicines you take. Ask your doctor when you can expect to have your test results. Where can you learn more? Go to http://www.gray.com/  Enter [id number] in the search box to learn more about \"Electromyogram (EMG) and Nerve Conduction Studies: About These Tests. \"  Current as of: December 13, 2021               Content Version: 13.4  © 2006-2022 Healthwise, Incorporated. Care instructions adapted under license by [employer] (which disclaims liability or warranty for this information). If you have questions about a medical condition or this instruction, always ask your healthcare professional. [address] any warranty or liability for your use of this information.

## 2023-03-27 DIAGNOSIS — F41.1 GENERALIZED ANXIETY DISORDER: ICD-10-CM

## 2023-03-28 RX ORDER — BUSPIRONE HYDROCHLORIDE 10 MG/1
TABLET ORAL
Qty: 270 TABLET | Refills: 3 | Status: SHIPPED | OUTPATIENT
Start: 2023-03-28

## 2023-04-20 ENCOUNTER — APPOINTMENT (OUTPATIENT)
Dept: MRI IMAGING | Age: 43
End: 2023-04-20
Attending: INTERNAL MEDICINE

## 2023-04-25 ENCOUNTER — OFFICE VISIT (OUTPATIENT)
Dept: INTERNAL MEDICINE CLINIC | Age: 43
End: 2023-04-25
Payer: COMMERCIAL

## 2023-04-25 VITALS
HEART RATE: 82 BPM | DIASTOLIC BLOOD PRESSURE: 81 MMHG | HEIGHT: 70 IN | RESPIRATION RATE: 16 BRPM | OXYGEN SATURATION: 96 % | WEIGHT: 144.5 LBS | SYSTOLIC BLOOD PRESSURE: 137 MMHG | TEMPERATURE: 97.8 F | BODY MASS INDEX: 20.69 KG/M2

## 2023-04-25 DIAGNOSIS — M35.9 UNDIFFERENTIATED CONNECTIVE TISSUE DISEASE (HCC): ICD-10-CM

## 2023-04-25 DIAGNOSIS — G43.819 OTHER MIGRAINE WITHOUT STATUS MIGRAINOSUS, INTRACTABLE: ICD-10-CM

## 2023-04-25 DIAGNOSIS — Z00.00 WELL ADULT EXAM: Primary | ICD-10-CM

## 2023-04-25 PROCEDURE — 99213 OFFICE O/P EST LOW 20 MIN: CPT | Performed by: INTERNAL MEDICINE

## 2023-04-25 PROCEDURE — 90715 TDAP VACCINE 7 YRS/> IM: CPT | Performed by: INTERNAL MEDICINE

## 2023-04-25 PROCEDURE — 99396 PREV VISIT EST AGE 40-64: CPT | Performed by: INTERNAL MEDICINE

## 2023-04-25 PROCEDURE — 90471 IMMUNIZATION ADMIN: CPT | Performed by: INTERNAL MEDICINE

## 2023-04-25 RX ORDER — DULOXETIN HYDROCHLORIDE 20 MG/1
20 CAPSULE, DELAYED RELEASE ORAL DAILY
Qty: 30 CAPSULE | Refills: 0 | Status: SHIPPED | OUTPATIENT
Start: 2023-04-25

## 2023-04-25 RX ORDER — ELETRIPTAN HYDROBROMIDE 40 MG/1
TABLET, FILM COATED ORAL
Qty: 12 TABLET | Refills: 1 | Status: SHIPPED | OUTPATIENT
Start: 2023-04-25

## 2023-04-25 NOTE — PROGRESS NOTES
Chief Complaint   Patient presents with    Physical     No pap needed    Labs     Nonfasting     Patient presents for her physical visit. She reports overall good health except that she has persistent joint issues that she has been worked up by rheumatology. She has Jazmin Flynn today who is her foster child. He is 14 months old    Joint pain  Patient reports that she has been worked up by Dr. Carly Harris and then Dr. Kris Urbano. For joint pain has become a quality-of-life issue for her. She has 4 kids and Jazmin Flynn. She reports significant wrist ankle joint pain in the morning. She almost feels like a robot in the morning. Her fingers swell occasionally. She has been followed by rheumatology with sed rate and inflammatory markers. Her joint issues are causing some anxiety symptoms      Anxiety   Related to health/joint issues  She has not been working out a lot due to her symptoms and also cannot really weight training because of her wrists. She cannot also lean on her hands  She has been using BuSpar and no side effects with libido.   Note that she has been on prior SSRIs and sexual side effects    Migraines  Menses related   Relpax been working for her however not covered by her insurance  She takes Fioricet as needed  She has migraines about 3 times per month      GYN  Followed by Dr. Kaycee Grayson      Past Medical History:   Diagnosis Date    Abnormal Pap smear     Colposcopy in 2002    Headache     migraines    Herpes simplex without mention of complication     Cold sores (Oct 2013)    Ill-defined condition     rosacia    Infertility     IVF with first 3 pregnancies, not this pregnancy    Unspecified breast disorder     biospy right breast,galactocele     Past Surgical History:   Procedure Laterality Date    HX BREAST AUGMENTATION Bilateral 11/25/2015    BILATERAL BREAST AUGMENTATION W RIGHT BREAST PERIAREOLAR MASTOPEXY SILICONE  performed by Iza Huertas MD at [facility]    HX BREAST AUGMENTATION Bilateral 11/2015    [facility] [address], [facility]    HX BREAST REDUCTION Right 2015    BREAST MASTOPEXY performed by Lisa Carey MD at [facility] MAIN OR    HX  SECTION      HX  SECTION      HX LEEP PROCEDURE      HX OTHER SURGICAL      Laproscopy for Infertility    HX OTHER SURGICAL      LEEP    HX OTHER SURGICAL      D&C    IMPLANT BREAST SILICONE/EQ [place]     [place]  DELIVERY ONLY  2012    Twins    [place] EXPLORATORY LAPAROTOMY CELIOTOMY W/WO BIOPSY SPX      For infertility     Social History     Socioeconomic History    Marital status:    Tobacco Use    Smoking status: Never    Smokeless tobacco: Never   Vaping Use    Vaping Use: Never used   Substance and Sexual Activity    Alcohol use:  Yes     Alcohol/week: 2.0 standard drinks     Types: 1 Glasses of wine, 1 Cans of beer per week     Comment: rarely    Drug use: No    Sexual activity: Yes     Partners: Male     Birth control/protection: Surgical     Comment: vasectomy   Social History Narrative     many years ago    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 Viviana Houser    At home with the kids        4 children    10, 6 6and 8year old    All healthy            Exercise     Gym 3 times/week     Social Determinants of Health     Financial Resource Strain: Low Risk     Difficulty of Paying Living Expenses: Not hard at all   Food Insecurity: No Food Insecurity    Worried About Running Out of Food in the Last Year: Never true    [address] in the Last Year: Never true   Transportation Needs: No Transportation Needs    Lack of Transportation (Medical): No    Lack of Transportation (Non-Medical): No   Housing Stability: Unknown    Unable to Pay for Housing in the Last Year: No    Unstable Housing in the Last Year: No     Family History   Problem Relation Age of Onset    Hypertension Father     Asthma Brother     Elevated Lipids Maternal Grandfather     Elevated Lipids Paternal Grandmother     Hypertension Paternal Grandmother     No Known Problems Mother      Current Outpatient Medications   Medication Sig Dispense Refill    diclofenac EC (VOLTAREN) 75 mg EC tablet Take 1 Tablet by mouth two (2) times a day. 180 Tablet 0    busPIRone (BUSPAR) 10 mg tablet TAKE ONE AND ONE-HALF TABLETS BY MOUTH TWICE A  Tablet 3    lansoprazole (PREVACID) 30 mg capsule Take 1 Capsule by mouth Daily (before breakfast). Intent to wean off 90 Capsule 2    eletriptan (RELPAX) 40 mg tablet TAKE 1 TABLET BY MOUTH FOR MIGRAINE. MAY REPEAT IN 2 HOURS IF NECESSARY. MAX 80MG/DAY. DO NOT TAKE WITH FIORICET 12 Tablet 1    valACYclovir (VALTREX) 1 gram tablet TAKE 2 TABLETS BY MOUTH NOW &REPEAT IN 12HRS. *4 TABLETS TOTAL PER OUTBREAK*, 12 Tablet 3    butalbital-acetaminophen-caffeine (FIORICET, ESGIC) -40 mg per tablet Take 2 tabs po only as needed daily for migraine  Indications: a migraine headache 30 Tablet 2    melatonin 5 mg tablet Take 2 Tablets by mouth nightly. No Known Allergies    Review of Systems - General ROS: negative for - chills, fatigue, fever or hot flashes  Cardiovascular ROS: no chest pain or dyspnea on exertion  Respiratory ROS: no cough, shortness of breath, or wheezing    Visit Vitals  /81 (BP 1 Location: Left upper arm, BP Patient Position: Sitting, BP Cuff Size: Adult)   Pulse 82   Temp 97.8 °F (36.6 °C) (Oral)   Resp 16   Ht 5' 10\" (1.778 m)   Wt 144 lb 8 oz (65.5 kg)   LMP 04/23/2023   SpO2 96%   BMI 20.73 kg/m²     General Appearance:  Well developed, well nourished,alert and oriented x 3, and individual in no acute distress. Ears/Nose/Mouth/Throat:   Hearing grossly normal.         Neck: Supple, no lad, no bruits   Chest:   Lungs clear to auscultation bilaterally. Cardiovascular:  Regular rate and rhythm, S1, S2 normal, no murmur. Abdomen:   Soft, non-tender, bowel sounds are active. Extremities: No edema bilaterally.     Skin: Warm and dry, no suspicious lesions                 Prevention    Cardiovascular profile  Family hx  Exercising:  3 times /week  Cant lift arm    boflex bike but using [employer]ma  Walking   Feels better after working out    Blood pressure:  Health healthy diet:  Diabetes:  Cholesterol:  Renal function:  Recently vegan      Cancer risk profile  Mammogram no sx 10/2020  , does not desire further children, may   Lung no sx with exercise  Colonoscopy no sx endoscopy and coloncopy  blood in stool--2019  Skin nonhealing in 2 weeks derm no wwill find antoehr derm  Gyn abnormal bleeding/discharge/abd pain/pressure- dr Brook Mack      Thyroid sx      Osteopenia prevention  Calcium 1000mg/day yes  Vitamin D 800iu/day yes    Mental health scale: as above  Depression  Anxiety  Sleep # of hours:  Energy Level:        Immunizations  TDAP 2012  Pneumonia vaccine  Flu vaccine  Shingles vaccine  HPV    Diagnoses and all orders for this visit:    1. Well adult exam  Patient presents in overall very good health except symptoms of intermittent joint pains causing quality-of-life issues  -     JULIANO 3D YASEMIN W MAMMO BI SCREENING INCL CAD; Future  -     HEPATITIS C AB; Future  -     TDAP, BOOSTRIX, (AGE 10 YRS+), IM  -     LIPID PANEL; Future    2. Undifferentiated connective tissue disease ([facility] [address]  Patient with intermittent pain  Of interest became vegetarian about 8 years ago  Explored possibility of her getting back to a Mediterranean type diet with fish/meat to obtain amino acids  Given the pain she may benefit from Cymbalta for pain and may also offset some symptomatic anxiety as we worked her up with rheumatology  -     DULoxetine (CYMBALTA) 20 mg capsule; Take 1 Capsule by mouth daily. She will be seeing a rheumatologist in Dr. Kadi Ponce office    3.  Other migraine without status migrainosus, intractable  Does not appear optimally controlled  Would benefit from coming off Fioricet  Discussed with patient and she will use Relpax through [facility] and if needed can use Nurtec as backup  We can monitor her symptoms and potentially have her on Nurtec every other day for migraine prophylaxis if needed  -     rimegepant (NURTEC) 75 mg disintegrating tablet; Take 1 Tablet by mouth once as needed for Migraine for up to 1 dose. Indications: a migraine headache  -     eletriptan (RELPAX) 40 mg tablet; TAKE 1 TABLET BY MOUTH FOR MIGRAINE. MAY REPEAT IN 2 HOURS IF NECESSARY. MAX 80MG/DAY. DO NOT TAKE WITH FIORICET       Follow-up in 3 to 6 months      This medical record was transcribed using an electronic medical records/speech recognition system. Although proofread, it may and can contain electronic, spelling and other errors. Corrections may be executed at a later time. Please contact us for any clarifications as needed. Aside from patient's prevention visit on this date 04/25/23  I have spent 28 minutes reviewing previous notes, test results and face to face with the patient discussing the diagnosis and importance of compliance with the treatment plan as well as documenting on the day of the visit.

## 2023-05-08 DIAGNOSIS — Z00.00 WELL ADULT EXAM: Primary | ICD-10-CM

## 2023-05-17 ENCOUNTER — TRANSCRIBE ORDERS (OUTPATIENT)
Facility: HOSPITAL | Age: 43
End: 2023-05-17

## 2023-05-17 DIAGNOSIS — Z00.00 WELL ADULT EXAM: Primary | ICD-10-CM

## 2023-05-19 ENCOUNTER — CLINICAL DOCUMENTATION (OUTPATIENT)
Age: 43
End: 2023-05-19

## 2023-06-01 RX ORDER — DULOXETIN HYDROCHLORIDE 20 MG/1
20 CAPSULE, DELAYED RELEASE ORAL DAILY
Qty: 90 CAPSULE | Refills: 1 | Status: SHIPPED | OUTPATIENT
Start: 2023-06-01

## 2023-06-01 RX ORDER — SCOLOPAMINE TRANSDERMAL SYSTEM 1 MG/1
1 PATCH, EXTENDED RELEASE TRANSDERMAL
Qty: 10 PATCH | Refills: 0 | Status: SHIPPED | OUTPATIENT
Start: 2023-06-01

## 2023-07-20 NOTE — TELEPHONE ENCOUNTER
Pharmacy was calling to say PA was approved for medication NURTEC but wondering if we can change prescription to 18 tablets

## 2023-07-21 RX ORDER — RIMEGEPANT SULFATE 75 MG/75MG
75 TABLET, ORALLY DISINTEGRATING ORAL DAILY PRN
Qty: 18 TABLET | Refills: 0 | Status: SHIPPED | OUTPATIENT
Start: 2023-07-21 | End: 2023-08-02 | Stop reason: SDUPTHER

## 2023-08-01 ENCOUNTER — TELEPHONE (OUTPATIENT)
Age: 43
End: 2023-08-01

## 2023-08-01 NOTE — TELEPHONE ENCOUNTER
Pool Guerra from [facility] called to inform us that they had a computer error an deleted the medication by mistake. His request was to resend a new prescription for:    Rimegepant Sulfate ([address] [place] [address]) 75 MG TBDP    [address] [place] [address] Dr Stephen ANDRADE [phone]

## 2023-08-02 RX ORDER — RIMEGEPANT SULFATE 75 MG/75MG
75 TABLET, ORALLY DISINTEGRATING ORAL DAILY PRN
Qty: 18 TABLET | Refills: 0 | Status: SHIPPED | OUTPATIENT
Start: 2023-08-02 | End: 2023-09-01

## 2023-09-22 ENCOUNTER — TELEPHONE (OUTPATIENT)
Age: 43
End: 2023-09-22

## 2023-09-22 NOTE — TELEPHONE ENCOUNTER
----- Message from Lina Shaikh MD sent at 9/22/2023  1:34 PM EDT -----  Regarding: please advise pt/ ? JORDANA or urgent care? Hi, sorry I am not here today. I just saw this E-visit request.  I do not know if she could be seen by Eva Rios or have her evaluated at urgent care? Thank you.

## 2023-09-22 NOTE — TELEPHONE ENCOUNTER
Spoke with patient and she reports that she is having symptoms of a UTI-burning with urination. Advised that Dr. Yue Jean is not in today and there are no available providers at this time. Advised patient that she would need to go to Urgent Care for evaluation and treatment. She states understanding and grateful for the call.

## 2023-09-26 ENCOUNTER — TRANSCRIBE ORDERS (OUTPATIENT)
Facility: HOSPITAL | Age: 43
End: 2023-09-26

## 2023-09-26 DIAGNOSIS — Z12.31 ENCOUNTER FOR SCREENING MAMMOGRAM FOR MALIGNANT NEOPLASM OF BREAST: Primary | ICD-10-CM

## 2023-10-26 ENCOUNTER — HOSPITAL ENCOUNTER (OUTPATIENT)
Facility: HOSPITAL | Age: 43
Discharge: HOME | End: 2023-10-26
Attending: INTERNAL MEDICINE
Payer: COMMERCIAL

## 2023-10-26 DIAGNOSIS — Z12.31 ENCOUNTER FOR SCREENING MAMMOGRAM FOR MALIGNANT NEOPLASM OF BREAST: ICD-10-CM

## 2023-10-26 PROCEDURE — 77063 BREAST TOMOSYNTHESIS BI: CPT

## 2023-12-13 ENCOUNTER — PATIENT MESSAGE (OUTPATIENT)
Age: 43
End: 2023-12-13

## 2023-12-13 RX ORDER — VALACYCLOVIR HYDROCHLORIDE 1 G/1
TABLET, FILM COATED ORAL
Qty: 30 TABLET | Refills: 0 | Status: SHIPPED | OUTPATIENT
Start: 2023-12-13

## 2023-12-13 NOTE — TELEPHONE ENCOUNTER
From: Heidi Tidwell  To: Dr. Valeri Olivas  Sent: 12/13/2023 1:18 PM EST  Subject: Refill Request    Good afternoon. Can I get a refill of NURTEC (not on my med list for some reason sent to [address] [place] [address]? Also, can I get refills on Valacyclovir (wont let me request this one off my medlist) also sent to [facility]?     Thank you - Noah Rachel

## 2024-04-11 ENCOUNTER — OFFICE VISIT (OUTPATIENT)
Age: 44
End: 2024-04-11

## 2024-04-11 VITALS
RESPIRATION RATE: 16 BRPM | SYSTOLIC BLOOD PRESSURE: 133 MMHG | WEIGHT: 144 LBS | BODY MASS INDEX: 20.62 KG/M2 | HEIGHT: 70 IN | DIASTOLIC BLOOD PRESSURE: 94 MMHG | TEMPERATURE: 98.1 F | HEART RATE: 87 BPM | OXYGEN SATURATION: 98 %

## 2024-04-11 DIAGNOSIS — T78.40XA ALLERGY, INITIAL ENCOUNTER: Primary | ICD-10-CM

## 2024-04-11 LAB
GROUP A STREP ANTIGEN, POC: NEGATIVE
VALID INTERNAL CONTROL, POC: YES

## 2024-04-11 RX ORDER — BENZONATATE 100 MG/1
100 CAPSULE ORAL 3 TIMES DAILY PRN
Qty: 30 CAPSULE | Refills: 0 | Status: SHIPPED | OUTPATIENT
Start: 2024-04-11 | End: 2024-04-21

## 2024-04-11 RX ORDER — METHYLPREDNISOLONE 4 MG/1
TABLET ORAL
Qty: 21 TABLET | Refills: 0 | Status: SHIPPED | OUTPATIENT
Start: 2024-04-11 | End: 2024-04-17

## 2024-04-11 ASSESSMENT — ENCOUNTER SYMPTOMS
EYE ITCHING: 0
VOMITING: 0
RHINORRHEA: 1
EYE DISCHARGE: 0
NAUSEA: 0
SORE THROAT: 1
DIARRHEA: 0
ABDOMINAL PAIN: 0
COUGH: 1

## 2024-04-11 NOTE — PROGRESS NOTES
Subjective     Chief Complaint   Patient presents with    Pharyngitis    Facial Pain     Onset of symptoms x 6 days       Patient ID:  Jen Morales is a 43 y.o. female.    42 yo female presents for 6 day duration of sore throat, facial pain, cough, laryngitis.        Review of Systems   Constitutional:  Negative for activity change.   HENT:  Positive for congestion, rhinorrhea and sore throat.    Eyes:  Negative for discharge and itching.   Respiratory:  Positive for cough.    Gastrointestinal:  Negative for abdominal pain, diarrhea, nausea and vomiting.   Genitourinary:  Negative for dysuria.       Past Medical History:   Diagnosis Date    Abnormal Pap smear     Colposcopy in     Headache     migraines    Herpes simplex without mention of complication     Cold sores (Oct 2013)    Ill-defined condition     rosacia    Unspecified breast disorder     biospy right breast,galactocele       Past Surgical History:   Procedure Laterality Date    BREAST REDUCTION SURGERY Right 2015    BREAST MASTOPEXY performed by Etienne Frank MD at [facility] MAIN OR    BREAST SURGERY Bilateral 2015    BILATERAL BREAST AUGMENTATION W RIGHT BREAST PERIAREOLAR MASTOPEXY SILICONE  performed by Etienne Frank MD at [facility] MAIN OR    BREAST SURGERY Bilateral 2015    [facility]     DELIVERY ONLY  2012    Twins     SECTION  2012     SECTION  2014    EXPLORATORY OF ABDOMEN      For infertility    IMPLANT BREAST SILICONE/EQ Bilateral         LEEP      OTHER SURGICAL HISTORY      LEEP    OTHER SURGICAL HISTORY      D&C    OTHER SURGICAL HISTORY  2009    Laproscopy for Infertility       Family History   Problem Relation Age of Onset    Asthma Brother     Hypertension Paternal Grandmother     Elevated Lipids Paternal Grandmother     Elevated Lipids Maternal Grandfather     No Known Problems Mother     Hypertension Father        No Known Allergies    Social History     Tobacco Use

## 2024-04-11 NOTE — PATIENT INSTRUCTIONS
Use medicines as prescribed, continue OTC meds, BP elevated today could be med related but recommend BP check with PCP in [employer].

## 2024-05-02 ENCOUNTER — OFFICE VISIT (OUTPATIENT)
Age: 44
End: 2024-05-02

## 2024-05-02 VITALS
SYSTOLIC BLOOD PRESSURE: 122 MMHG | HEIGHT: 70 IN | BODY MASS INDEX: 20.3 KG/M2 | WEIGHT: 141.8 LBS | DIASTOLIC BLOOD PRESSURE: 83 MMHG | OXYGEN SATURATION: 98 % | HEART RATE: 70 BPM | RESPIRATION RATE: 14 BRPM

## 2024-05-02 DIAGNOSIS — F41.9 ANXIETY: ICD-10-CM

## 2024-05-02 DIAGNOSIS — R53.83 FATIGUE, UNSPECIFIED TYPE: ICD-10-CM

## 2024-05-02 DIAGNOSIS — R23.2 HOT FLASHES: ICD-10-CM

## 2024-05-02 DIAGNOSIS — M35.9 SYSTEMIC INVOLVEMENT OF CONNECTIVE TISSUE, UNSPECIFIED (HCC): ICD-10-CM

## 2024-05-02 DIAGNOSIS — Z00.00 WELL ADULT EXAM: Primary | ICD-10-CM

## 2024-05-02 DIAGNOSIS — G43.819 OTHER MIGRAINE, INTRACTABLE, WITHOUT STATUS MIGRAINOSUS: ICD-10-CM

## 2024-05-02 RX ORDER — BUTALBITAL, ACETAMINOPHEN AND CAFFEINE 50; 325; 40 MG/1; MG/1; MG/1
TABLET ORAL
Qty: 12 TABLET | Refills: 0 | Status: SHIPPED | OUTPATIENT
Start: 2024-05-02

## 2024-05-02 RX ORDER — ELETRIPTAN HYDROBROMIDE 40 MG/1
TABLET, FILM COATED ORAL
Qty: 8 TABLET | Refills: 0 | Status: SHIPPED | OUTPATIENT
Start: 2024-05-02

## 2024-05-02 RX ORDER — BUSPIRONE HYDROCHLORIDE 10 MG/1
TABLET ORAL
Qty: 270 TABLET | Refills: 3 | Status: SHIPPED | OUTPATIENT
Start: 2024-05-02

## 2024-05-02 SDOH — ECONOMIC STABILITY: INCOME INSECURITY: HOW HARD IS IT FOR YOU TO PAY FOR THE VERY BASICS LIKE FOOD, HOUSING, MEDICAL CARE, AND HEATING?: NOT HARD AT ALL

## 2024-05-02 SDOH — ECONOMIC STABILITY: FOOD INSECURITY: WITHIN THE PAST 12 MONTHS, THE FOOD YOU BOUGHT JUST DIDN'T LAST AND YOU DIDN'T HAVE MONEY TO GET MORE.: NEVER TRUE

## 2024-05-02 SDOH — ECONOMIC STABILITY: HOUSING INSECURITY
IN THE LAST 12 MONTHS, WAS THERE A TIME WHEN YOU DID NOT HAVE A STEADY PLACE TO SLEEP OR SLEPT IN A SHELTER (INCLUDING NOW)?: NO

## 2024-05-02 SDOH — ECONOMIC STABILITY: FOOD INSECURITY: WITHIN THE PAST 12 MONTHS, YOU WORRIED THAT YOUR FOOD WOULD RUN OUT BEFORE YOU GOT MONEY TO BUY MORE.: NEVER TRUE

## 2024-05-02 ASSESSMENT — PATIENT HEALTH QUESTIONNAIRE - PHQ9
2. FEELING DOWN, DEPRESSED OR HOPELESS: NOT AT ALL
SUM OF ALL RESPONSES TO PHQ QUESTIONS 1-9: 0
1. LITTLE INTEREST OR PLEASURE IN DOING THINGS: NOT AT ALL
SUM OF ALL RESPONSES TO PHQ9 QUESTIONS 1 & 2: 0

## 2024-05-02 NOTE — PROGRESS NOTES
1. Well adult exam    Patient presents for her annual.  She reports overall good health although still with chronic symptoms of joint pain.  She has not seen another rheumatologist in the community.  She enjoyed her time as a .  She and her  have fostered several children in the past.     Comprehensive Metabolic Panel; Future  -     Lipid Panel; Future  2. Other migraine, intractable, without status migrainosus  Not optimally controlled  Prefer her not to be on Fioricet as potential for rebound situation    Given her frequency and longevity of symptoms we will try her on CGRP Nurtec every other day.  If she has breakthrough she can try Relpax or another triptan if her insurance will allow.  She notes that the Nurtec did help in the past    -     rimegepant sulfate 75 MG TBDP; Take 1 tablet by mouth every other day, Disp-45 tablet, R-1Pt with chronic migraines.Normal  -     [facility] - Prem Bourgeois, NP, Neurology, [place]  -     butalbital-acetaminophen-caffeine (FIORICET, ESGIC) -40 MG per tablet; Take 2 tabs po only as needed daily for migraine  Indications: a migraine headache. In case relpax not covered, Disp-12 tablet, R-0Normal  -     eletriptan (RELPAX) 40 MG tablet; TAKE 1 TABLET BY MOUTH FOR MIGRAINE. MAY REPEAT IN 2 HOURS IF NECESSARY. MAX 80MG/DAY., Disp-8 tablet, R-0Normal      3. Anxiety  BuSpar works but upon further discussion may not be optimal  -     busPIRone (BUSPAR) 10 MG tablet; TAKE ONE AND ONE-HALF TABLETS BY MOUTH TWICE A DAY, Disp-270 tablet, R-3Normal      4. Systemic involvement of connective tissue, unspecified (HCC)  Appears to have chronic history of joint pains and negative workup.  On review appears to have a positive rheumatoid factor.  She is seeing rheumatologist Dr. Dinh so we will share labs with him    Her prior rheumatologist at [facility] put her on diclofenac twice a day.  Prefer her not to be on this as she potentially could be on this

## 2024-05-06 ENCOUNTER — TELEPHONE (OUTPATIENT)
Age: 44
End: 2024-05-06

## 2024-05-06 NOTE — TELEPHONE ENCOUNTER
Radha called from [employer] and she is requesting a call back to discuss canceling or refilling the medication.    butalbital-acetaminophen-caffeine (FIORICET, ESGIC) -40 MG per tablet     [facility] - [place], [place] - [address], [address] - P [phone] - F [phone]

## 2024-05-15 ENCOUNTER — PATIENT MESSAGE (OUTPATIENT)
Age: 44
End: 2024-05-15

## 2024-05-16 RX ORDER — DICLOFENAC SODIUM 75 MG/1
75 TABLET, DELAYED RELEASE ORAL 2 TIMES DAILY
Qty: 180 TABLET | Refills: 3 | Status: SHIPPED | OUTPATIENT
Start: 2024-05-16 | End: 2025-05-11

## 2024-05-16 RX ORDER — MECOBALAMIN 5000 MCG
15 TABLET,DISINTEGRATING ORAL
Qty: 90 CAPSULE | Refills: 1 | Status: SHIPPED | OUTPATIENT
Start: 2024-05-16

## 2024-05-16 NOTE — TELEPHONE ENCOUNTER
From: Jen Morales  To: Dr. Myah eWeks  Sent: 5/15/2024 8:28 PM EDT  Subject: Diclofenac    Per our conversation, can I please get a year supply of the diclofenac 75 MG EC tablet (180 tabs with 3 refills) sent to [facility]? Thanks.

## 2024-07-15 DIAGNOSIS — R53.83 FATIGUE, UNSPECIFIED TYPE: ICD-10-CM

## 2024-07-15 DIAGNOSIS — M35.9 SYSTEMIC INVOLVEMENT OF CONNECTIVE TISSUE, UNSPECIFIED (HCC): ICD-10-CM

## 2024-07-15 DIAGNOSIS — R23.2 HOT FLASHES: ICD-10-CM

## 2024-07-15 DIAGNOSIS — Z00.00 WELL ADULT EXAM: ICD-10-CM

## 2024-07-15 LAB
ALBUMIN SERPL-MCNC: 3.6 G/DL (ref 3.5–5)
ALBUMIN/GLOB SERPL: 1.2 (ref 1.1–2.2)
ALP SERPL-CCNC: 64 U/L (ref 45–117)
ALT SERPL-CCNC: 95 U/L (ref 12–78)
ANION GAP SERPL CALC-SCNC: 6 MMOL/L (ref 5–15)
AST SERPL-CCNC: 59 U/L (ref 15–37)
BILIRUB SERPL-MCNC: 0.5 MG/DL (ref 0.2–1)
BUN SERPL-MCNC: 15 MG/DL (ref 6–20)
BUN/CREAT SERPL: 20 (ref 12–20)
CALCIUM SERPL-MCNC: 8.9 MG/DL (ref 8.5–10.1)
CHLORIDE SERPL-SCNC: 108 MMOL/L (ref 97–108)
CHOLEST SERPL-MCNC: 176 MG/DL
CO2 SERPL-SCNC: 26 MMOL/L (ref 21–32)
CREAT SERPL-MCNC: 0.75 MG/DL (ref 0.55–1.02)
ERYTHROCYTE [DISTWIDTH] IN BLOOD BY AUTOMATED COUNT: 15 % (ref 11.5–14.5)
FERRITIN SERPL-MCNC: 8 NG/ML (ref 8–252)
GLOBULIN SER CALC-MCNC: 2.9 G/DL (ref 2–4)
GLUCOSE SERPL-MCNC: 91 MG/DL (ref 65–100)
HCT VFR BLD AUTO: 38.2 % (ref 35–47)
HDLC SERPL-MCNC: 66 MG/DL
HDLC SERPL: 2.7 (ref 0–5)
HGB BLD-MCNC: 12 G/DL (ref 11.5–16)
IRON SATN MFR SERPL: 13 % (ref 20–50)
IRON SERPL-MCNC: 53 UG/DL (ref 35–150)
LDLC SERPL CALC-MCNC: 89.4 MG/DL (ref 0–100)
MCH RBC QN AUTO: 26.3 PG (ref 26–34)
MCHC RBC AUTO-ENTMCNC: 31.4 G/DL (ref 30–36.5)
MCV RBC AUTO: 83.6 FL (ref 80–99)
NRBC # BLD: 0 K/UL (ref 0–0.01)
NRBC BLD-RTO: 0 PER 100 WBC
PLATELET # BLD AUTO: 211 K/UL (ref 150–400)
PMV BLD AUTO: 10.8 FL (ref 8.9–12.9)
POTASSIUM SERPL-SCNC: 4.1 MMOL/L (ref 3.5–5.1)
PROT SERPL-MCNC: 6.5 G/DL (ref 6.4–8.2)
RBC # BLD AUTO: 4.57 M/UL (ref 3.8–5.2)
SODIUM SERPL-SCNC: 140 MMOL/L (ref 136–145)
T4 FREE SERPL-MCNC: 1.1 NG/DL (ref 0.8–1.5)
TIBC SERPL-MCNC: 424 UG/DL (ref 250–450)
TRIGL SERPL-MCNC: 103 MG/DL
TSH SERPL DL<=0.05 MIU/L-ACNC: 2.79 UIU/ML (ref 0.36–3.74)
VLDLC SERPL CALC-MCNC: 20.6 MG/DL
WBC # BLD AUTO: 4.9 K/UL (ref 3.6–11)

## 2024-07-16 LAB — ANA SER QL: NEGATIVE

## 2024-07-19 DIAGNOSIS — R74.8 ELEVATED LIVER ENZYMES: Primary | ICD-10-CM

## 2024-07-26 LAB
14-3-3 ETA AG SER IA-MCNC: <0.2 NG/ML
CCP IGA+IGG SERPL IA-ACNC: <20 UNITS
RHEUMATOID FACT SERPL-ACNC: 18.3 UNITS/ML

## 2024-07-29 DIAGNOSIS — R74.8 ELEVATED LIVER ENZYMES: ICD-10-CM

## 2024-07-29 LAB
ALBUMIN SERPL-MCNC: 3.3 G/DL (ref 3.5–5)
ALBUMIN/GLOB SERPL: 1.2 (ref 1.1–2.2)
ALP SERPL-CCNC: 76 U/L (ref 45–117)
ALT SERPL-CCNC: 102 U/L (ref 12–78)
ANION GAP SERPL CALC-SCNC: 2 MMOL/L (ref 5–15)
APTT PPP: 27.3 SEC (ref 22.1–31)
AST SERPL-CCNC: 65 U/L (ref 15–37)
BILIRUB SERPL-MCNC: 0.4 MG/DL (ref 0.2–1)
BUN SERPL-MCNC: 17 MG/DL (ref 6–20)
BUN/CREAT SERPL: 27 (ref 12–20)
CALCIUM SERPL-MCNC: 8.9 MG/DL (ref 8.5–10.1)
CHLORIDE SERPL-SCNC: 109 MMOL/L (ref 97–108)
CO2 SERPL-SCNC: 27 MMOL/L (ref 21–32)
CREAT SERPL-MCNC: 0.63 MG/DL (ref 0.55–1.02)
GGT SERPL-CCNC: 38 U/L (ref 5–55)
GLOBULIN SER CALC-MCNC: 2.7 G/DL (ref 2–4)
GLUCOSE SERPL-MCNC: 62 MG/DL (ref 65–100)
LDH SERPL L TO P-CCNC: 171 U/L (ref 81–246)
POTASSIUM SERPL-SCNC: 3.8 MMOL/L (ref 3.5–5.1)
PROT SERPL-MCNC: 6 G/DL (ref 6.4–8.2)
SODIUM SERPL-SCNC: 138 MMOL/L (ref 136–145)
THERAPEUTIC RANGE: NORMAL SECS (ref 58–77)

## 2024-07-30 LAB
LKM-1 AB SER-ACNC: 0.9 UNITS (ref 0–20)
SMA IGG SER-ACNC: 3 UNITS (ref 0–19)

## 2024-07-31 LAB
5NT SERPL-CCNC: 4 IU/L (ref 0–10)
ANA SER QL: NEGATIVE
IGA SERPL-MCNC: 141 MG/DL (ref 87–352)
TTG IGA SER-ACNC: <2 U/ML (ref 0–3)

## 2024-08-02 DIAGNOSIS — G43.819 OTHER MIGRAINE, INTRACTABLE, WITHOUT STATUS MIGRAINOSUS: ICD-10-CM

## 2024-08-05 RX ORDER — ELETRIPTAN HYDROBROMIDE 40 MG/1
TABLET, FILM COATED ORAL
Qty: 8 TABLET | Refills: 0 | Status: SHIPPED | OUTPATIENT
Start: 2024-08-05

## 2024-08-05 NOTE — TELEPHONE ENCOUNTER
PCP: Myah Weeks MD    Last appt: 5/2/2024   No future appointments.    Requested Prescriptions     Pending Prescriptions Disp Refills    eletriptan (RELPAX) 40 MG tablet [Pharmacy Med Name: ELETRIPTAN HYDROBROMIDE 40MG TABS] 8 tablet 0     Sig: TAKE ONE TABLET BY MOUTH FOR MIGRAINE. MAY REPEAT DOSE IN 2 HOURS IF NECESSARY. MAX TWO TABLETS DAILY     Rx in pending for provider review and approval.    Marisa \"Burak\" STEPHANY Mccall

## 2024-08-06 DIAGNOSIS — R74.8 ELEVATED LIVER ENZYMES: Primary | ICD-10-CM

## 2024-09-03 DIAGNOSIS — R74.8 ELEVATED LIVER ENZYMES: ICD-10-CM

## 2024-09-03 LAB
ALBUMIN SERPL-MCNC: 3.7 G/DL (ref 3.5–5)
ALBUMIN/GLOB SERPL: 1.3 (ref 1.1–2.2)
ALP SERPL-CCNC: 62 U/L (ref 45–117)
ALT SERPL-CCNC: 50 U/L (ref 12–78)
AST SERPL-CCNC: 37 U/L (ref 15–37)
BILIRUB DIRECT SERPL-MCNC: 0.1 MG/DL (ref 0–0.2)
BILIRUB SERPL-MCNC: 0.4 MG/DL (ref 0.2–1)
GLOBULIN SER CALC-MCNC: 2.8 G/DL (ref 2–4)
PROT SERPL-MCNC: 6.5 G/DL (ref 6.4–8.2)

## 2024-09-05 DIAGNOSIS — M35.9 SYSTEMIC INVOLVEMENT OF CONNECTIVE TISSUE, UNSPECIFIED (HCC): Primary | ICD-10-CM

## 2024-10-30 ENCOUNTER — TRANSCRIBE ORDERS (OUTPATIENT)
Facility: HOSPITAL | Age: 44
End: 2024-10-30

## 2024-10-30 ENCOUNTER — HOSPITAL ENCOUNTER (OUTPATIENT)
Facility: HOSPITAL | Age: 44
Discharge: HOME | End: 2024-11-02
Payer: COMMERCIAL

## 2024-10-30 DIAGNOSIS — Z12.31 OTHER SCREENING MAMMOGRAM: Primary | ICD-10-CM

## 2024-10-30 DIAGNOSIS — Z12.31 OTHER SCREENING MAMMOGRAM: ICD-10-CM

## 2024-10-30 PROCEDURE — 77063 BREAST TOMOSYNTHESIS BI: CPT

## 2024-12-15 RX ORDER — MECOBALAMIN 5000 MCG
15 TABLET,DISINTEGRATING ORAL
Qty: 90 CAPSULE | Refills: 1 | Status: SHIPPED | OUTPATIENT
Start: 2024-12-15

## 2024-12-17 DIAGNOSIS — G43.819 OTHER MIGRAINE, INTRACTABLE, WITHOUT STATUS MIGRAINOSUS: ICD-10-CM

## 2024-12-17 RX ORDER — ELETRIPTAN HYDROBROMIDE 40 MG/1
TABLET, FILM COATED ORAL
Qty: 8 TABLET | Refills: 0 | Status: SHIPPED | OUTPATIENT
Start: 2024-12-17

## 2025-02-22 ENCOUNTER — OFFICE VISIT (OUTPATIENT)
Age: 45
End: 2025-02-22

## 2025-02-22 VITALS
DIASTOLIC BLOOD PRESSURE: 81 MMHG | TEMPERATURE: 98.9 F | SYSTOLIC BLOOD PRESSURE: 120 MMHG | OXYGEN SATURATION: 97 % | HEIGHT: 70 IN | HEART RATE: 98 BPM | WEIGHT: 151.4 LBS | BODY MASS INDEX: 21.67 KG/M2

## 2025-02-22 DIAGNOSIS — J01.10 ACUTE NON-RECURRENT FRONTAL SINUSITIS: Primary | ICD-10-CM

## 2025-02-22 RX ORDER — CETIRIZINE HYDROCHLORIDE 10 MG/1
10 TABLET ORAL DAILY
COMMUNITY

## 2025-02-22 RX ORDER — MELOXICAM 15 MG/1
15 TABLET ORAL DAILY
COMMUNITY

## 2025-02-22 RX ORDER — HYDROXYCHLOROQUINE SULFATE 200 MG/1
200 TABLET, FILM COATED ORAL 2 TIMES DAILY
COMMUNITY

## 2025-02-22 RX ORDER — AZITHROMYCIN 500 MG/1
500 TABLET, FILM COATED ORAL DAILY
Qty: 3 TABLET | Refills: 0 | Status: SHIPPED | OUTPATIENT
Start: 2025-02-22 | End: 2025-02-25

## 2025-02-22 NOTE — PATIENT INSTRUCTIONS
If symptoms worsens or fail to improve follow-up with PCP or ER.  Thank you for visiting [facility] today    Nasal Congestion:  Flonase or Nasonex (over the counter) nasal spray, once a day  Saline irrigation kits help wash out sinuses 1-2 times a day  Normal saline nasal spray  Cough:  Throat lozenges, hot tea, and honey may help  Vicks VapoRub at night to help with cough and relieve muscles aches and pain    Congestion:  Take Advil Cold and Sinus as directed  Sore Throat:  Lozenges, as needed. Cepacol lozenges will help numb the throat  Chloraseptic spray also helps to numb throat pain  Salt water gargles to soothe throat pain  Sinus pain/pressure:  Warm, wet towel on face to help with facial sinus pain/pressure  Headache/Pain Fever/Body Aches:  If you cannot take NSAIDs, take Tylenol 325-500mg every 6 hours as needed  Miscellanous:  Zyrtec/Xyzal/Allegra/Claritin during the day   Simple foods like chicken noodle soup, smoothies, hot tea with lemon and honey may also help  Avoid smoking  Minimize exposure to irritants    Please follow up with your primary care provider within 2-5 days if your signs and symptoms have not resolved or worsened.    Please go immediately to the Emergency Department if you develop shortness of breath, chest pain and uncontrollable fever above 100.4F.

## 2025-02-22 NOTE — PROGRESS NOTES
Subjective   History was provided by the patient.    Jen Morales is a 44 y.o. female who presents for evaluation of symptoms of a URI. Symptoms include fatigue, clear nasal discharge, nasal congestion, post nasal drip, and facial pain/sinus pressure: bilateral maxillary and bilateral frontal. Onset of symptoms was 1 week ago, and is gradually worsening since that time. Evaluation to date: none. Treatment to date: oral decongestant, saline spray/neti pot , antihistamines.       Objective   Physical Exam   General: alert, appears stated age, and cooperative  Ear exam: normal TM's and external ear canals both ears  Sinus exam: tenderness over bilateral frontal  Oropharynx exam: normal findings: pink and moist  Neck exam: no adenopathy  Heart exam: S1 and S2 normal  Lung exam: clear to auscultation bilaterally      Assessment   1. Acute non-recurrent frontal sinusitis  Patient appears well, VSS, afebrile, SPO2 97% on room air. No distress noted. Ears normal.  Throat and pharynx normal.  Neck supple. No adenopathy in the neck. Nose is congested. Sinuses non tender. The chest is clear, without wheezes or rales.    Patient is previously healthy and immunocompetent, presenting with symptoms suspicious for likely viral upper respiratory infection.  Differential includes acute bronchitis, rhinosinusitis, allergic rhinitis, bacterial pneumonia, or COVID. Patient is nontoxic appearing and not in need of emergent medical intervention.    The patient is a good candidate for outpatient therapy based on normal PO intake, reassuring exam with clear lungs on auscultation, normal oxygen saturations and lack of respiratory distress upon discharge.    Discharge decision based on the following:  clinical impression is consistent with outpatient treatment, patient's exam is stable and patient's condition is stable. Patient advised if symptoms fail to improve or worsens to follow-up with PCP or ER.  Patient verbalized

## 2025-02-25 RX ORDER — VALACYCLOVIR HYDROCHLORIDE 1 G/1
TABLET, FILM COATED ORAL
Qty: 30 TABLET | Refills: 0 | Status: SHIPPED | OUTPATIENT
Start: 2025-02-25

## 2025-07-02 DIAGNOSIS — G43.819 OTHER MIGRAINE, INTRACTABLE, WITHOUT STATUS MIGRAINOSUS: ICD-10-CM

## 2025-07-02 DIAGNOSIS — F41.9 ANXIETY: ICD-10-CM

## 2025-07-02 RX ORDER — BUSPIRONE HYDROCHLORIDE 10 MG/1
TABLET ORAL
Qty: 270 TABLET | Refills: 3 | Status: SHIPPED | OUTPATIENT
Start: 2025-07-02

## 2025-07-02 RX ORDER — ELETRIPTAN HYDROBROMIDE 40 MG/1
TABLET, FILM COATED ORAL
Qty: 8 TABLET | Refills: 0 | Status: SHIPPED | OUTPATIENT
Start: 2025-07-02

## 2025-07-02 RX ORDER — MECOBALAMIN 5000 MCG
15 TABLET,DISINTEGRATING ORAL
Qty: 90 CAPSULE | Refills: 1 | Status: SHIPPED | OUTPATIENT
Start: 2025-07-02

## 2025-07-11 LAB
ALBUMIN: 4.3 G/DL
ALP BLD-CCNC: 54 U/L
ALT SERPL-CCNC: 14 U/L
ANION GAP SERPL CALCULATED.3IONS-SCNC: 7 MMOL/L
ANTIBODY: NEGATIVE
ANTIGEN INTERPRETATION: NEGATIVE
AST SERPL-CCNC: 15 U/L
BASOPHILS ABSOLUTE: 0.3 /ΜL
BASOPHILS RELATIVE PERCENT: 0.9 %
BILIRUB SERPL-MCNC: 0.5 MG/DL (ref 0.1–1.4)
BUN BLDV-MCNC: 17 MG/DL
CALCIUM SERPL-MCNC: 9.3 MG/DL
CHLORIDE BLD-SCNC: 106 MMOL/L
CO2: 28 MMOL/L
CREAT SERPL-MCNC: 0.64 MG/DL
EOSINOPHILS ABSOLUTE: 0 /ΜL
EOSINOPHILS RELATIVE PERCENT: 5.3 %
GFR, ESTIMATED: 110
GLUCOSE BLD-MCNC: 82 MG/DL
HCT VFR BLD CALC: 40.4 % (ref 36–46)
HEMOGLOBIN: 13.5 G/DL (ref 12–16)
LYMPHOCYTES ABSOLUTE: 1.1 /ΜL
LYMPHOCYTES RELATIVE PERCENT: 20.4 %
MCH RBC QN AUTO: 29.5 PG
MCHC RBC AUTO-ENTMCNC: 33.4 G/DL
MCV RBC AUTO: 88 FL
MONOCYTES ABSOLUTE: 0.4 /ΜL
MONOCYTES RELATIVE PERCENT: 8.4 %
NEUTROPHILS ABSOLUTE: 3.5 /ΜL
NEUTROPHILS RELATIVE PERCENT: 65 %
PLATELET # BLD: 212 K/ΜL
PMV BLD AUTO: NORMAL FL
POTASSIUM SERPL-SCNC: 4.4 MMOL/L
QUANTIFERON TB GOLD PLUS: NEGATIVE
RBC # BLD: 4.58 10^6/ΜL
SODIUM BLD-SCNC: 141 MMOL/L
TOTAL PROTEIN: 6.3 G/DL (ref 6.4–8.2)
WBC # BLD: 5.4 10^3/ML

## 2025-07-31 ENCOUNTER — TELEPHONE (OUTPATIENT)
Facility: HOSPITAL | Age: 45
End: 2025-07-31

## 2025-07-31 ENCOUNTER — ENROLLMENT (OUTPATIENT)
Facility: HOSPITAL | Age: 45
End: 2025-07-31

## 2025-07-31 NOTE — TELEPHONE ENCOUNTER
Specialty Medication Service    Date: 7/31/2025  Patient's Name: Jen Morales YOB: 1980            _____________________________________________________________________________________________    Called patient's specialist office to request most recent office visit summary and relevant labs for Specialty Medication Service formulary medication. Faxed to have faxed to [phone]. Added pa notes    Sent referral to complete also.    Mildred Garcia CPhT  Pharmacy   Specialty Medication Services   Phone: [phone] option 4      For Pharmacy Admin Tracking Only    Program: SMS  CPA in place:  No  Recommendation Provided To: Provider: 2 via Fax sent to office  Intervention Detail: Referral to Other Provider  Intervention Accepted By: Provider: 0  Gap Closed?:    Time Spent (min): 15

## 2025-07-31 NOTE — TELEPHONE ENCOUNTER
Specialty Medication Service    Date: 7/31/2025  Patient's Name: Jen Morales YOB: 1980            _____________________________________________________________________________________________    Patient transferred from [facility] to schedule PharmD initial appointment for Specialty Medication Services. Patient scheduled 08/01/25 at 1pm Called specialist office (MILLI Chavez) to request office notes.      Mildred Garcia CPhT  Pharmacy   Specialty Medication Services   Phone: [phone] option 4      For Pharmacy Admin Tracking Only    Program: [facility]  CPA in place:  No  Recommendation Provided To: Patient/Caregiver: 1 via Telephone  Intervention Detail: Scheduled Appointment  Intervention Accepted By: Patient/Caregiver: 1  Gap Closed?:    Time Spent (min): 15

## 2025-08-01 ENCOUNTER — PHARMACY VISIT (OUTPATIENT)
Facility: HOSPITAL | Age: 45
End: 2025-08-01

## 2025-08-01 DIAGNOSIS — M06.09 RHEUMATOID ARTHRITIS OF MULTIPLE SITES WITHOUT RHEUMATOID FACTOR (HCC): Primary | ICD-10-CM

## 2025-08-01 RX ORDER — ADALIMUMAB-BWWD 40 MG/.4ML
40 SOLUTION SUBCUTANEOUS
COMMUNITY
Start: 2025-08-01

## 2025-08-01 ASSESSMENT — PROMIS GLOBAL HEALTH SCALE
IN GENERAL, HOW WOULD YOU RATE YOUR PHYSICAL HEALTH [ON A SCALE OF 1 (POOR) TO 5 (EXCELLENT)]?: GOOD
IN THE PAST 7 DAYS, HOW OFTEN HAVE YOU BEEN BOTHERED BY EMOTIONAL PROBLEMS, SUCH AS FEELING ANXIOUS, DEPRESSED, OR IRRITABLE [ON A SCALE FROM 1 (NEVER) TO 5 (ALWAYS)]?: OFTEN
IN GENERAL, HOW WOULD YOU RATE YOUR MENTAL HEALTH, INCLUDING YOUR MOOD AND YOUR ABILITY TO THINK [ON A SCALE OF 1 (POOR) TO 5 (EXCELLENT)]?: GOOD
IN GENERAL, PLEASE RATE HOW WELL YOU CARRY OUT YOUR USUAL SOCIAL ACTIVITIES (INCLUDES ACTIVITIES AT HOME, AT WORK, AND IN YOUR COMMUNITY, AND RESPONSIBILITIES AS A PARENT, CHILD, SPOUSE, EMPLOYEE, FRIEND, ETC) [ON A SCALE OF 1 (POOR) TO 5 (EXCELLENT)]?: GOOD
IN GENERAL, HOW WOULD YOU RATE YOUR SATISFACTION WITH YOUR SOCIAL ACTIVITIES AND RELATIONSHIPS [ON A SCALE OF 1 (POOR) TO 5 (EXCELLENT)]?: GOOD
IN THE PAST 7 DAYS, HOW OFTEN HAVE YOU BEEN BOTHERED BY EMOTIONAL PROBLEMS, SUCH AS FEELING ANXIOUS, DEPRESSED, OR IRRITABLE [ON A SCALE FROM 1 (NEVER) TO 5 (ALWAYS)]?: OFTEN
IN THE PAST 7 DAYS, HOW WOULD YOU RATE YOUR PAIN ON AVERAGE [ON A SCALE FROM 0 (NO PAIN) TO 10 (WORST IMAGINABLE PAIN)]?: 3
IN THE PAST 7 DAYS, HOW WOULD YOU RATE YOUR FATIGUE ON AVERAGE [ON A SCALE FROM 1 (NONE) TO 5 (VERY SEVERE)]?: MODERATE
IN GENERAL, WOULD YOU SAY YOUR HEALTH IS...[ON A SCALE OF 1 (POOR) TO 5 (EXCELLENT)]: GOOD
IN THE PAST 7 DAYS, HOW WOULD YOU RATE YOUR FATIGUE ON AVERAGE [ON A SCALE FROM 1 (NONE) TO 5 (VERY SEVERE)]?: MODERATE
TO WHAT EXTENT ARE YOU ABLE TO CARRY OUT YOUR EVERYDAY PHYSICAL ACTIVITIES SUCH AS WALKING, CLIMBING STAIRS, CARRYING GROCERIES, OR MOVING A CHAIR [ON A SCALE OF 1 (NOT AT ALL) TO 5 (COMPLETELY)]?: MOSTLY
IN THE PAST 7 DAYS, HOW WOULD YOU RATE YOUR PAIN ON AVERAGE [ON A SCALE FROM 0 (NO PAIN) TO 10 (WORST IMAGINABLE PAIN)]?: 3
IN GENERAL, HOW WOULD YOU RATE YOUR SATISFACTION WITH YOUR SOCIAL ACTIVITIES AND RELATIONSHIPS [ON A SCALE OF 1 (POOR) TO 5 (EXCELLENT)]?: GOOD
IN GENERAL, PLEASE RATE HOW WELL YOU CARRY OUT YOUR USUAL SOCIAL ACTIVITIES (INCLUDES ACTIVITIES AT HOME, AT WORK, AND IN YOUR COMMUNITY, AND RESPONSIBILITIES AS A PARENT, CHILD, SPOUSE, EMPLOYEE, FRIEND, ETC) [ON A SCALE OF 1 (POOR) TO 5 (EXCELLENT)]?: GOOD
WHO IS THE PERSON COMPLETING THE PROMIS V1.1 SURVEY?: SELF
WHO IS THE PERSON COMPLETING THE PROMIS V1.1 SURVEY?: SELF
SUM OF RESPONSES TO QUESTIONS 3, 6, 7, & 8: 13
IN GENERAL, WOULD YOU SAY YOUR QUALITY OF LIFE IS...[ON A SCALE OF 1 (POOR) TO 5 (EXCELLENT)]: GOOD
HOW IS THE PROMIS V1.1 BEING ADMINISTERED?: ELECTRONIC
TO WHAT EXTENT ARE YOU ABLE TO CARRY OUT YOUR EVERYDAY PHYSICAL ACTIVITIES SUCH AS WALKING, CLIMBING STAIRS, CARRYING GROCERIES, OR MOVING A CHAIR [ON A SCALE OF 1 (NOT AT ALL) TO 5 (COMPLETELY)]?: MOSTLY
SUM OF RESPONSES TO QUESTIONS 2, 4, 5, & 10: 11
HOW IS THE PROMIS V1.1 BEING ADMINISTERED?: ELECTRONIC

## 2025-08-01 ASSESSMENT — PATIENT HEALTH QUESTIONNAIRE - PHQ9
SUM OF ALL RESPONSES TO PHQ QUESTIONS 1-9: 0
2. FEELING DOWN, DEPRESSED OR HOPELESS: NOT AT ALL
1. LITTLE INTEREST OR PLEASURE IN DOING THINGS: NOT AT ALL
SUM OF ALL RESPONSES TO PHQ QUESTIONS 1-9: 0

## 2025-08-01 NOTE — PROGRESS NOTES
Specialty Medication Service    Patient's Name: Jen Morales YOB: 1980      Jen Morales is a 45 y.o. female presenting today for Specialty Medication Service visit. Patient is prescribed SMS formulary medication, Hadlima. Medication list updated.    Specialty Medication: Hadlima   Frequency: every 14 days  Indication: M06.09 - Rheumatoid arthritis without rheumatoid factor, multiple sites   Initially Diagnosed: 2025 (symptoms occurring 6 years prior)  Additional Therapy:   Meloxicam 15 mg as needed  Hydroxychloroquine 200 mg BID  Previous Therapy:   MTX (nausea with higher doses)  Hydroxychloroquine     Specialist:   Du Chavez MD  [address] [place] [address]  P: [phone]    Specialist Progress Note Available: Yes, scanned in Media tab  Last Specialist Visit: 07/11/2025: RA follow-up. Hydroxychloroquine not adequate on own. Cannot tolerate MTX at 25 mg weekly.     Plan:   Start Humira 40 mg every 14 days  Continue Plaquenil 200 mg BID  Labs: CBC, CMP, Quantiferon, Hepatitis  Follow-up 2 months      No Known Allergies    There were no vitals filed for this visit.   Past Medical History:   Diagnosis Date    Abnormal Pap smear     Colposcopy in 2002    Headache     migraines    Herpes simplex without mention of complication     Cold sores (Oct 2013)    Ill-defined condition     rosacia    Unspecified breast disorder     biospy right breast,galactocele      Social History     Tobacco Use    Smoking status: Never    Smokeless tobacco: Never   Substance Use Topics    Alcohol use: Yes     Alcohol/week: 2.0 standard drinks of alcohol     Comment: socially     Family History   Problem Relation Age of Onset    Asthma Brother     Hypertension Paternal Grandmother     Elevated Lipids Paternal Grandmother     Elevated Lipids Maternal Grandfather     No Known Problems Mother     Hypertension Father        REVIEW OF CURRENT DISEASE STATE  Rheumatoid Arthritis: Patient with

## 2025-08-11 ENCOUNTER — PHARMACY VISIT (OUTPATIENT)
Facility: HOSPITAL | Age: 45
End: 2025-08-11

## 2025-08-11 DIAGNOSIS — M06.09 RHEUMATOID ARTHRITIS OF MULTIPLE SITES WITHOUT RHEUMATOID FACTOR (HCC): Primary | ICD-10-CM

## 2025-08-11 RX ORDER — ADALIMUMAB-BWWD 40 MG/.4ML
SOLUTION SUBCUTANEOUS
Qty: 0.8 ML | Refills: 1 | Status: SHIPPED | OUTPATIENT
Start: 2025-08-11